# Patient Record
Sex: FEMALE | Race: WHITE | NOT HISPANIC OR LATINO
[De-identification: names, ages, dates, MRNs, and addresses within clinical notes are randomized per-mention and may not be internally consistent; named-entity substitution may affect disease eponyms.]

---

## 2020-04-26 ENCOUNTER — MESSAGE (OUTPATIENT)
Age: 25
End: 2020-04-26

## 2020-05-09 LAB
SARS-COV-2 IGG SERPL IA-ACNC: <0.1 INDEX
SARS-COV-2 IGG SERPL QL IA: NEGATIVE

## 2021-08-18 ENCOUNTER — TRANSCRIPTION ENCOUNTER (OUTPATIENT)
Age: 26
End: 2021-08-18

## 2021-08-23 PROBLEM — Z00.00 ENCOUNTER FOR PREVENTIVE HEALTH EXAMINATION: Status: ACTIVE | Noted: 2021-08-23

## 2021-08-24 ENCOUNTER — APPOINTMENT (OUTPATIENT)
Dept: MRI IMAGING | Facility: CLINIC | Age: 26
End: 2021-08-24
Payer: COMMERCIAL

## 2021-08-24 ENCOUNTER — OUTPATIENT (OUTPATIENT)
Dept: OUTPATIENT SERVICES | Facility: HOSPITAL | Age: 26
LOS: 1 days | End: 2021-08-24
Payer: COMMERCIAL

## 2021-08-24 DIAGNOSIS — G45.1 CAROTID ARTERY SYNDROME (HEMISPHERIC): ICD-10-CM

## 2021-08-24 PROCEDURE — 70551 MRI BRAIN STEM W/O DYE: CPT | Mod: 26

## 2021-08-24 PROCEDURE — 70551 MRI BRAIN STEM W/O DYE: CPT

## 2021-08-26 ENCOUNTER — NON-APPOINTMENT (OUTPATIENT)
Age: 26
End: 2021-08-26

## 2021-08-27 ENCOUNTER — APPOINTMENT (OUTPATIENT)
Dept: NEUROLOGY | Facility: CLINIC | Age: 26
End: 2021-08-27
Payer: COMMERCIAL

## 2021-08-27 ENCOUNTER — NON-APPOINTMENT (OUTPATIENT)
Age: 26
End: 2021-08-27

## 2021-08-27 PROCEDURE — 93892 TCD EMBOLI DETECT W/O INJ: CPT

## 2021-08-27 PROCEDURE — 93886 INTRACRANIAL COMPLETE STUDY: CPT

## 2021-09-15 ENCOUNTER — APPOINTMENT (OUTPATIENT)
Dept: NEUROLOGY | Facility: CLINIC | Age: 26
End: 2021-09-15
Payer: COMMERCIAL

## 2021-09-15 VITALS
WEIGHT: 146 LBS | DIASTOLIC BLOOD PRESSURE: 78 MMHG | HEIGHT: 59 IN | HEART RATE: 81 BPM | SYSTOLIC BLOOD PRESSURE: 127 MMHG | BODY MASS INDEX: 29.43 KG/M2

## 2021-09-15 DIAGNOSIS — Z78.9 OTHER SPECIFIED HEALTH STATUS: ICD-10-CM

## 2021-09-15 DIAGNOSIS — Z82.0 FAMILY HISTORY OF EPILEPSY AND OTHER DISEASES OF THE NERVOUS SYSTEM: ICD-10-CM

## 2021-09-15 DIAGNOSIS — Z83.3 FAMILY HISTORY OF DIABETES MELLITUS: ICD-10-CM

## 2021-09-15 PROCEDURE — 99204 OFFICE O/P NEW MOD 45 MIN: CPT

## 2021-09-15 RX ORDER — ACETAMINOPHEN, ASPIRIN, AND CAFFEINE 250; 250; 65 MG/1; MG/1; MG/1
TABLET, FILM COATED ORAL
Refills: 0 | Status: ACTIVE | COMMUNITY

## 2021-09-16 PROBLEM — Z82.0 FAMILY HISTORY OF MIGRAINE HEADACHES: Status: ACTIVE | Noted: 2021-09-15

## 2021-09-16 NOTE — HISTORY OF PRESENT ILLNESS
[FreeTextEntry1] : 26 year old f with PMH migraines headache, presents today for treatment\par \par she reports since middle school she has had headaches  starts behind eyes, right temple sometimes both associated with light and sound sensitivity. relieved with tylenol/advil . occurred once /week. had to miss school.\par throughout the years they continued with increased intensity. \par \par 3 years ago, she stared have left sided neck pain and blurred visual with the headache. saw neurologist, Dr. Sheldon at [facility] started vitamin therapy ibuprofen. sent TCD/CD which was normal. was  also on birth control at this time which she stopped it. Headaches did not change in quality/severity.\par \par a month ago she had a headache for two weeks daily, felt different, had nausea/photo/phono phobia and blurred vision. worsened with lifting heavy, no changes with using bathroom.   pain started in back of head, squeezing radiated to bilateral temporal  and "band" feeling. [place] [facility] [place]. saw Dr. Sheldon, repeated TCD showing increased velocities.  and Brain MRI which showed pineal cyst\par \par  \par Pertinent negatives: blurred vision,visual aura, scotoma, memory impairment, neck stiffness, nausea\par Aggravated Factors: none\par Relived by: dark room, rest,  Excedrin \par Severity:  3-10\par Occurs: daily \par \par \par FH: mother with migraines\par Sleeps: 5-6hrs uninterrupted  1pm-5p. works night shifts\par Hydration: water: 1 gallon/d   caffeine:1 cup 1x/week\par Triggers:bright lights, warm temps. stress\par Exercise: daily \par  \par \par wear glasses, saw optho, 6 months \par has been under more stress more than usual for the past few months.  last took Meds 3 weeks ago. \par

## 2021-09-16 NOTE — ASSESSMENT
[FreeTextEntry1] : 26 year old female with chronic headaches since teenager.MRI with with pineal cyst.Her TCD with probable stenosis for which  MRA will be obtain to r/o stenosis. will repeat Brain MRI in 3 mths\par trial of vitamin therapy \par Excedrin prn at onset as she tends to delay \par  \par Keeping a diary has been discussed, including Apps- Migraine jessie.  \par \par Manage stress. Stress may trigger a migraine. Learn new ways to relax, such as\par deep breathing.\par \par Create a sleep schedule. Go to bed and get up at the same times each day. Do\par not watch television or go on electronics in bed.\par \par Eat regular meals.  Maintain adequate water intake.\par \par contact me if there are any changes in the quality or severity of the headaches.\par   \par

## 2021-09-16 NOTE — PHYSICAL EXAM
[Person] : oriented to person [Place] : oriented to place [Time] : oriented to time [Cranial Nerves Optic (II)] : visual acuity intact bilaterally,  visual fields full to confrontation, pupils equal round and reactive to light [Cranial Nerves Oculomotor (III)] : extraocular motion intact [Cranial Nerves Trigeminal (V)] : facial sensation intact symmetrically [Cranial Nerves Facial (VII)] : face symmetrical [Cranial Nerves Vestibulocochlear (VIII)] : hearing was intact bilaterally [Cranial Nerves Glossopharyngeal (IX)] : tongue and palate midline [Cranial Nerves Accessory (XI - Cranial And Spinal)] : head turning and shoulder shrug symmetric [Cranial Nerves Hypoglossal (XII)] : there was no tongue deviation with protrusion [Sensation Tactile Decrease] : light touch was intact [Abnormal Walk] : normal gait [Balance] : balance was intact [PERRL With Normal Accommodation] : pupils were equal in size, round, reactive to light, with normal accommodation [Extraocular Movements] : extraocular movements were intact [Impaired Insight] : insight and judgment were intact [Mood] : the mood was normal [Short Term Intact] : short term memory intact [Current Events] : adequate knowledge of current events [Past History] : adequate knowledge of personal past history [Motor Handedness Right-Handed] : the patient is right hand dominant [FreeTextEntry9] : DTR symmetric [No Spinal Tenderness] : no spinal tenderness

## 2021-10-17 ENCOUNTER — RESULT REVIEW (OUTPATIENT)
Age: 26
End: 2021-10-17

## 2021-10-17 ENCOUNTER — OUTPATIENT (OUTPATIENT)
Dept: OUTPATIENT SERVICES | Facility: HOSPITAL | Age: 26
LOS: 1 days | End: 2021-10-17
Payer: COMMERCIAL

## 2021-10-17 ENCOUNTER — APPOINTMENT (OUTPATIENT)
Dept: MRI IMAGING | Facility: CLINIC | Age: 26
End: 2021-10-17
Payer: COMMERCIAL

## 2021-10-17 DIAGNOSIS — R51.9 HEADACHE, UNSPECIFIED: ICD-10-CM

## 2021-10-17 PROCEDURE — 70549 MR ANGIOGRAPH NECK W/O&W/DYE: CPT

## 2021-10-17 PROCEDURE — 70546 MR ANGIOGRAPH HEAD W/O&W/DYE: CPT | Mod: 26

## 2021-10-17 PROCEDURE — A9585: CPT

## 2021-10-17 PROCEDURE — 70546 MR ANGIOGRAPH HEAD W/O&W/DYE: CPT

## 2021-10-17 PROCEDURE — 70549 MR ANGIOGRAPH NECK W/O&W/DYE: CPT | Mod: 26

## 2021-10-20 ENCOUNTER — APPOINTMENT (OUTPATIENT)
Dept: NEUROLOGY | Facility: CLINIC | Age: 26
End: 2021-10-20
Payer: COMMERCIAL

## 2021-10-20 VITALS
SYSTOLIC BLOOD PRESSURE: 129 MMHG | HEIGHT: 59 IN | HEART RATE: 74 BPM | DIASTOLIC BLOOD PRESSURE: 83 MMHG | WEIGHT: 146 LBS | BODY MASS INDEX: 29.43 KG/M2

## 2021-10-20 PROCEDURE — 99213 OFFICE O/P EST LOW 20 MIN: CPT

## 2021-10-20 NOTE — PHYSICAL EXAM
[Person] : oriented to person [Place] : oriented to place [Time] : oriented to time [Short Term Intact] : short term memory intact [Current Events] : adequate knowledge of current events [Past History] : adequate knowledge of personal past history [Cranial Nerves Optic (II)] : visual acuity intact bilaterally,  visual fields full to confrontation, pupils equal round and reactive to light [Cranial Nerves Oculomotor (III)] : extraocular motion intact [Cranial Nerves Trigeminal (V)] : facial sensation intact symmetrically [Cranial Nerves Facial (VII)] : face symmetrical [Cranial Nerves Vestibulocochlear (VIII)] : hearing was intact bilaterally [Cranial Nerves Glossopharyngeal (IX)] : tongue and palate midline [Cranial Nerves Accessory (XI - Cranial And Spinal)] : head turning and shoulder shrug symmetric [Cranial Nerves Hypoglossal (XII)] : there was no tongue deviation with protrusion [Motor Handedness Right-Handed] : the patient is right hand dominant [Sensation Tactile Decrease] : light touch was intact [Abnormal Walk] : normal gait [Balance] : balance was intact [FreeTextEntry9] : DTR symmetric [FreeTextEntry1] : trap. msk/occipital notch tender L>R

## 2021-10-20 NOTE — HISTORY OF PRESENT ILLNESS
[FreeTextEntry1] : Patient reports since last  seen, Her headaches are less, also shorter duration can still be daily, but does not affect her ADLs.\par she notices they can be triggered by flashing lights/ bright lights.\par they are occurring in the afternoon when she wakes.  sleeps 1pm-4pm  works 11p-7am.\par she struggles to fall asleep. also doing school. works out for 45mins in mornings. \par \par \par Interval history \par 26 year old f with PMH migraines headache, presents today for treatment\par she reports since middle school she has had headaches  starts behind eyes, right temple sometimes both associated with light and sound sensitivity. relieved with tylenol/advil . occurred once /week. had to miss school.\par throughout the years they continued with increased intensity. \par \par 3 years ago, she stared have left sided neck pain and blurred visual with the headache. saw neurologist, Dr. Sheldon at [facility] started vitamin therapy ibuprofen. sent TCD/CD which was normal. was  also on birth control at this time which she stopped it. Headaches did not change in quality/severity.\par \par a month ago she had a headache for two weeks daily, felt different, had nausea/photo/phono phobia and blurred vision. worsened with lifting heavy, no changes with using bathroom.   pain started in back of head, squeezing radiated to bilateral temporal  and "band" feeling. [place] [facility] [place] BP was elevated. saw Dr. Sheldon, repeated TCD showing increased velocities.  and Brain MRI which showed pineal cyst\par \par  \par Pertinent negatives: blurred vision,visual aura, scotoma, memory impairment, neck stiffness, nausea\par Aggravated Factors: none\par Relived by: dark room, rest,  Excedrin \par Severity:  3-10\par Occurs: daily \par \par \par FH: mother with migraines\par Sleeps: 5-6hrs uninterrupted  1pm-5p. works night shifts\par Hydration: water: 1 gallon/d   caffeine:1 cup 1x/week\par Triggers:bright lights, warm temps. stress\par Exercise: daily \par  \par \par wear glasses, saw optho, 6 months \par has been under more stress more than usual for the past few months.  last took Meds 3 weeks ago. \par

## 2021-10-20 NOTE — ASSESSMENT
[FreeTextEntry1] : She will continue with vitamin therapy daily  \par Excedrin prn at onset \par exercising before work \par Headache diary \par \par Manage stress. Stress may trigger a migraine. Learn new ways to relax, such as\par deep breathing.\par \par Create a sleep schedule. Go to bed and get up at the same times each day. Do\par not watch television or go on electronics in bed.\par \par continue with regular meals.  Maintain adequate water intake.\par \par contact me if there are any changes in the quality or severity of the headaches.\par   \par

## 2021-11-22 ENCOUNTER — APPOINTMENT (OUTPATIENT)
Dept: NEUROLOGY | Facility: CLINIC | Age: 26
End: 2021-11-22
Payer: COMMERCIAL

## 2021-11-22 VITALS
DIASTOLIC BLOOD PRESSURE: 77 MMHG | SYSTOLIC BLOOD PRESSURE: 129 MMHG | HEIGHT: 59 IN | WEIGHT: 150 LBS | HEART RATE: 78 BPM | BODY MASS INDEX: 30.24 KG/M2

## 2021-11-22 PROCEDURE — 99213 OFFICE O/P EST LOW 20 MIN: CPT

## 2021-11-22 RX ORDER — ELECTROLYTES/DEXTROSE
SOLUTION, ORAL ORAL
Refills: 0 | Status: ACTIVE | COMMUNITY

## 2021-11-22 RX ORDER — VERAPAMIL HYDROCHLORIDE 80 MG/1
80 TABLET ORAL
Qty: 90 | Refills: 0 | Status: COMPLETED | COMMUNITY
Start: 2021-08-18

## 2021-11-22 RX ORDER — MAGNESIUM OXIDE/MAG AA CHELATE 300 MG
CAPSULE ORAL
Refills: 0 | Status: ACTIVE | COMMUNITY

## 2021-11-22 RX ORDER — UBIDECARENONE/VIT E ACET 100MG-5
100 CAPSULE ORAL
Refills: 0 | Status: ACTIVE | COMMUNITY

## 2021-11-22 NOTE — PHYSICAL EXAM
[Person] : oriented to person [Place] : oriented to place [Time] : oriented to time [Short Term Intact] : short term memory intact [Current Events] : adequate knowledge of current events [Past History] : adequate knowledge of personal past history [Cranial Nerves Optic (II)] : visual acuity intact bilaterally,  visual fields full to confrontation, pupils equal round and reactive to light [Cranial Nerves Oculomotor (III)] : extraocular motion intact [Cranial Nerves Trigeminal (V)] : facial sensation intact symmetrically [Cranial Nerves Facial (VII)] : face symmetrical [Cranial Nerves Vestibulocochlear (VIII)] : hearing was intact bilaterally [Cranial Nerves Glossopharyngeal (IX)] : tongue and palate midline [Cranial Nerves Accessory (XI - Cranial And Spinal)] : head turning and shoulder shrug symmetric [Cranial Nerves Hypoglossal (XII)] : there was no tongue deviation with protrusion [Motor Handedness Right-Handed] : the patient is right hand dominant [Sensation Tactile Decrease] : light touch was intact [Abnormal Walk] : normal gait [Balance] : balance was intact [General Appearance - Alert] : alert [General Appearance - Well Developed] : well developed [Affect] : the affect was normal [Mood] : the mood was normal [FreeTextEntry9] : DTR symmetric [FreeTextEntry1] : trap. msk/ L>R

## 2021-11-22 NOTE — ASSESSMENT
[FreeTextEntry1] : She will continue with vitamin therapy daily  \par Excedrin prn at onset \par  \par continue with Headache diary \par Manage stress. Stress may trigger a migraine. Learn new ways to relax, such as\par deep breathing.\par Continue with sleep modifications  \par continue with regular meals.  Maintain adequate water intake.\par contact me if there are any changes in the quality or severity of the headaches.\par   \par

## 2021-11-22 NOTE — HISTORY OF PRESENT ILLNESS
[FreeTextEntry1] : **Interval hx 11.22.21: She reports since last seen, she has had 2 migraines that were triggered with a stressful moment/ lack of sleep and 1 before menses. Excedrin is effective. she has been taking Coq10 gummi with no adverse effects. taking 3mg of   melatonin. \par she had less HA and is happy with her current regime. \par \par \par \par \par **interval Hx 10.20.21:.Patient reports since last  seen, Her headache duration is shorter. can still be daily, but does not affect her ADLs she notices they can be triggered by flashing lights.\par they are occurring in the afternoon when she wakes.  sleeps 1pm-4pm  works 11p-7am.\par she struggles to fall asleep. also doing school. works out for 45mins in mornings. \par \par \par Initial  history \par 26 year old f with PMH migraines headache, presents today for treatment\par she reports since middle school she has had headaches  starts behind eyes, right temple sometimes both associated with light and sound sensitivity. relieved with tylenol/advil . occurred once /week. had to miss school.\par throughout the years they continued with increased intensity. \par \par 3 years ago, she stared have left sided neck pain and blurred visual with the headache. saw neurologist, Dr. Sheldon at [facility] started vitamin therapy ibuprofen. sent TCD/CD which was normal. was  also on birth control at this time which she stopped it. Headaches did not change in quality/severity.\par \par a month ago she had a headache for two weeks daily, felt different, had nausea/photo/phono phobia and blurred vision. worsened with lifting heavy, no changes with using bathroom.   pain started in back of head, squeezing radiated to bilateral temporal  and "band" feeling. [place] [facility] [place]. saw Dr. Sheldon, repeated TCD showing increased velocities.  and Brain MRI which showed pineal cyst\par \par  \par Pertinent negatives: blurred vision,visual aura, scotoma, memory impairment, neck stiffness, nausea\par Aggravated Factors: none\par Relived by: dark room, rest,  Excedrin \par Severity:  3-10\par Occurs: daily \par \par \par FH: mother with migraines\par Sleeps: 5-6hrs uninterrupted  1pm-5p. works night shifts\par Hydration: water: 1 gallon/d   caffeine:1 cup 1x/week\par Triggers:bright lights, warm temps. stress\par Exercise: daily \par  \par \par wear glasses, saw optho, 6 months \par has been under more stress more than usual for the past few months.  last took Meds 3 weeks ago. \par

## 2022-01-03 ENCOUNTER — APPOINTMENT (OUTPATIENT)
Dept: NEUROLOGY | Facility: CLINIC | Age: 27
End: 2022-01-03

## 2022-09-27 ENCOUNTER — APPOINTMENT (OUTPATIENT)
Dept: OBGYN | Facility: CLINIC | Age: 27
End: 2022-09-27

## 2022-09-27 ENCOUNTER — NON-APPOINTMENT (OUTPATIENT)
Age: 27
End: 2022-09-27

## 2022-09-27 VITALS
BODY MASS INDEX: 26.61 KG/M2 | DIASTOLIC BLOOD PRESSURE: 70 MMHG | HEIGHT: 59 IN | SYSTOLIC BLOOD PRESSURE: 120 MMHG | WEIGHT: 132 LBS

## 2022-09-27 DIAGNOSIS — Z01.419 ENCOUNTER FOR GYNECOLOGICAL EXAMINATION (GENERAL) (ROUTINE) W/OUT ABNORMAL FINDINGS: ICD-10-CM

## 2022-09-27 LAB
BASOPHILS # BLD AUTO: 0.04 K/UL
BASOPHILS NFR BLD AUTO: 0.5 %
EOSINOPHIL # BLD AUTO: 0.03 K/UL
EOSINOPHIL NFR BLD AUTO: 0.4 %
HCT VFR BLD CALC: 37.3 %
HGB BLD-MCNC: 12.7 G/DL
IMM GRANULOCYTES NFR BLD AUTO: 0.2 %
LYMPHOCYTES # BLD AUTO: 2.03 K/UL
LYMPHOCYTES NFR BLD AUTO: 24.1 %
MAN DIFF?: NORMAL
MCHC RBC-ENTMCNC: 30 PG
MCHC RBC-ENTMCNC: 34 GM/DL
MCV RBC AUTO: 88.2 FL
MONOCYTES # BLD AUTO: 0.32 K/UL
MONOCYTES NFR BLD AUTO: 3.8 %
NEUTROPHILS # BLD AUTO: 5.98 K/UL
NEUTROPHILS NFR BLD AUTO: 71 %
PLATELET # BLD AUTO: 274 K/UL
RBC # BLD: 4.23 M/UL
RBC # FLD: 12 %
WBC # FLD AUTO: 8.42 K/UL

## 2022-09-27 PROCEDURE — 76815 OB US LIMITED FETUS(S): CPT

## 2022-09-27 PROCEDURE — 99385 PREV VISIT NEW AGE 18-39: CPT
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
## 2022-09-27 NOTE — HISTORY OF PRESENT ILLNESS
[Normal Amount/Duration] :  normal amount and duration [Regular Cycle Intervals] : periods have been regular [Currently Active] : currently active [Men] : men [Vaginal] : vaginal [No] : No [Yes] : Yes [Patient refuses STI testing] : Patient refuses STI testing [TextBox_31] : never [FreeTextEntry1] : 8/25/22

## 2022-09-27 NOTE — PROCEDURE
[Transvaginal OB Sonogram] : Transvaginal OB Sonogram [Intrauterine Pregnancy] : intrauterine pregnancy [Yolk Sac] : yolk sac present [Fetal Heart] : no fetal heart [Current GA by Sonogram: ___ (wks)] : Current GA by Sonogram: [unfilled]Uwks [___ day(s)] : [unfilled] days [FreeTextEntry1] : GS: 1.31cm

## 2022-09-28 LAB
ABO + RH PNL BLD: NORMAL
BLD GP AB SCN SERPL QL: NORMAL
C TRACH RRNA SPEC QL NAA+PROBE: NOT DETECTED
N GONORRHOEA RRNA SPEC QL NAA+PROBE: NOT DETECTED
SOURCE AMPLIFICATION: NORMAL

## 2022-09-30 ENCOUNTER — APPOINTMENT (OUTPATIENT)
Dept: OBGYN | Facility: CLINIC | Age: 27
End: 2022-09-30

## 2022-09-30 VITALS
WEIGHT: 132 LBS | SYSTOLIC BLOOD PRESSURE: 123 MMHG | HEIGHT: 59 IN | DIASTOLIC BLOOD PRESSURE: 86 MMHG | BODY MASS INDEX: 26.61 KG/M2

## 2022-09-30 PROCEDURE — 76998 US GUIDE INTRAOP: CPT

## 2022-09-30 PROCEDURE — 99213 OFFICE O/P EST LOW 20 MIN: CPT | Mod: 25

## 2022-09-30 PROCEDURE — 59840 INDUCED ABORTION D&C: CPT

## 2022-09-30 RX ADMIN — KETOROLAC TROMETHAMINE 30 MG/ML: 30 INJECTION, SOLUTION INTRAMUSCULAR; INTRAVENOUS at 00:00

## 2022-10-01 LAB — CYTOLOGY CVX/VAG DOC THIN PREP: NORMAL

## 2022-10-06 LAB — CORE LAB BIOPSY: NORMAL

## 2022-10-12 ENCOUNTER — APPOINTMENT (OUTPATIENT)
Dept: PSYCHIATRY | Facility: CLINIC | Age: 27
End: 2022-10-12

## 2022-10-14 ENCOUNTER — APPOINTMENT (OUTPATIENT)
Dept: OBGYN | Facility: CLINIC | Age: 27
End: 2022-10-14

## 2022-10-14 VITALS
WEIGHT: 133 LBS | DIASTOLIC BLOOD PRESSURE: 70 MMHG | HEIGHT: 59 IN | SYSTOLIC BLOOD PRESSURE: 100 MMHG | BODY MASS INDEX: 26.81 KG/M2

## 2022-10-14 DIAGNOSIS — Z34.90 ENCOUNTER FOR SUPERVISION OF NORMAL PREGNANCY, UNSPECIFIED, UNSPECIFIED TRIMESTER: ICD-10-CM

## 2022-10-14 PROCEDURE — 99213 OFFICE O/P EST LOW 20 MIN: CPT
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
## 2022-10-18 ENCOUNTER — APPOINTMENT (OUTPATIENT)
Dept: PSYCHIATRY | Facility: CLINIC | Age: 27
End: 2022-10-18

## 2022-10-18 ENCOUNTER — TRANSCRIPTION ENCOUNTER (OUTPATIENT)
Age: 27
End: 2022-10-18

## 2022-10-18 PROCEDURE — 90791 PSYCH DIAGNOSTIC EVALUATION: CPT | Mod: 95

## 2022-10-27 ENCOUNTER — APPOINTMENT (OUTPATIENT)
Dept: PSYCHIATRY | Facility: CLINIC | Age: 27
End: 2022-10-27

## 2022-10-27 PROCEDURE — 90834 PSYTX W PT 45 MINUTES: CPT | Mod: 95

## 2022-11-04 ENCOUNTER — APPOINTMENT (OUTPATIENT)
Dept: PSYCHIATRY | Facility: CLINIC | Age: 27
End: 2022-11-04

## 2022-11-04 PROCEDURE — 90834 PSYTX W PT 45 MINUTES: CPT | Mod: 95

## 2022-11-11 ENCOUNTER — APPOINTMENT (OUTPATIENT)
Dept: PSYCHIATRY | Facility: CLINIC | Age: 27
End: 2022-11-11

## 2022-11-15 ENCOUNTER — APPOINTMENT (OUTPATIENT)
Dept: PSYCHIATRY | Facility: CLINIC | Age: 27
End: 2022-11-15

## 2022-11-15 ENCOUNTER — TRANSCRIPTION ENCOUNTER (OUTPATIENT)
Age: 27
End: 2022-11-15

## 2022-11-15 ENCOUNTER — NON-APPOINTMENT (OUTPATIENT)
Age: 27
End: 2022-11-15

## 2022-11-15 PROCEDURE — 90834 PSYTX W PT 45 MINUTES: CPT | Mod: 95

## 2022-11-16 ENCOUNTER — APPOINTMENT (OUTPATIENT)
Dept: OBGYN | Facility: CLINIC | Age: 27
End: 2022-11-16

## 2022-11-16 PROCEDURE — 99213 OFFICE O/P EST LOW 20 MIN: CPT | Mod: 95

## 2022-11-16 NOTE — HISTORY OF PRESENT ILLNESS
[FreeTextEntry1] : This visit was provided via Telehealth using real-time 2-way audio visual technology. The patient ROBERT SLAUGHTER was located at home [address] [place] [address] at the time of the visit. The provider Nilsa Jimenez was located at the medical office located in [place], [place] at the time of the visit. The patient ROBERT SLAUGHTER and provider participated in the Telehealth encounter. Verbal consent for Telehealth services was given on 2022 by the patient ROBERT SLAUGHTER.\par \par 28 y/o  LMP      presents for contraception counseling \par \par 10/25 started birth control and then stopped with LMP \par Had unprotected intercourse on  after unprotected sex on 10/11\par On  had condom break, took another dose of María Elena\par Threw up after María Elena\par

## 2022-11-16 NOTE — PLAN
[FreeTextEntry1] : 26 y/o  LMP      presents for contraception counseling \par \par Discussed mini pill dosing, will start 5 days after María Elena\par Still has another dose of María Elena\par Discussed other contraceptive options \par

## 2022-11-18 ENCOUNTER — APPOINTMENT (OUTPATIENT)
Dept: PSYCHIATRY | Facility: CLINIC | Age: 27
End: 2022-11-18

## 2022-11-18 PROCEDURE — 90834 PSYTX W PT 45 MINUTES: CPT | Mod: 95

## 2022-11-25 ENCOUNTER — APPOINTMENT (OUTPATIENT)
Dept: PSYCHIATRY | Facility: CLINIC | Age: 27
End: 2022-11-25

## 2022-11-25 PROCEDURE — 90834 PSYTX W PT 45 MINUTES: CPT | Mod: 95

## 2022-12-02 ENCOUNTER — APPOINTMENT (OUTPATIENT)
Dept: PSYCHIATRY | Facility: CLINIC | Age: 27
End: 2022-12-02

## 2022-12-09 ENCOUNTER — APPOINTMENT (OUTPATIENT)
Dept: PSYCHIATRY | Facility: CLINIC | Age: 27
End: 2022-12-09

## 2022-12-09 PROCEDURE — 90834 PSYTX W PT 45 MINUTES: CPT | Mod: 95

## 2022-12-16 ENCOUNTER — APPOINTMENT (OUTPATIENT)
Dept: PSYCHIATRY | Facility: CLINIC | Age: 27
End: 2022-12-16

## 2022-12-20 ENCOUNTER — APPOINTMENT (OUTPATIENT)
Dept: PSYCHIATRY | Facility: CLINIC | Age: 27
End: 2022-12-20

## 2022-12-20 PROCEDURE — 90834 PSYTX W PT 45 MINUTES: CPT | Mod: 95

## 2022-12-27 ENCOUNTER — APPOINTMENT (OUTPATIENT)
Dept: PSYCHIATRY | Facility: CLINIC | Age: 27
End: 2022-12-27

## 2022-12-27 PROCEDURE — 90834 PSYTX W PT 45 MINUTES: CPT | Mod: 95

## 2022-12-30 ENCOUNTER — APPOINTMENT (OUTPATIENT)
Dept: PSYCHIATRY | Facility: CLINIC | Age: 27
End: 2022-12-30

## 2022-12-31 ENCOUNTER — NON-APPOINTMENT (OUTPATIENT)
Age: 27
End: 2022-12-31

## 2023-01-06 ENCOUNTER — APPOINTMENT (OUTPATIENT)
Dept: PSYCHIATRY | Facility: CLINIC | Age: 28
End: 2023-01-06

## 2023-01-09 ENCOUNTER — NON-APPOINTMENT (OUTPATIENT)
Age: 28
End: 2023-01-09

## 2023-01-13 ENCOUNTER — APPOINTMENT (OUTPATIENT)
Dept: PSYCHIATRY | Facility: CLINIC | Age: 28
End: 2023-01-13

## 2023-01-20 ENCOUNTER — APPOINTMENT (OUTPATIENT)
Dept: PSYCHIATRY | Facility: CLINIC | Age: 28
End: 2023-01-20

## 2023-01-27 ENCOUNTER — APPOINTMENT (OUTPATIENT)
Dept: PSYCHIATRY | Facility: CLINIC | Age: 28
End: 2023-01-27

## 2023-02-03 ENCOUNTER — APPOINTMENT (OUTPATIENT)
Dept: PSYCHIATRY | Facility: CLINIC | Age: 28
End: 2023-02-03

## 2023-03-05 ENCOUNTER — NON-APPOINTMENT (OUTPATIENT)
Age: 28
End: 2023-03-05

## 2023-05-08 ENCOUNTER — NON-APPOINTMENT (OUTPATIENT)
Age: 28
End: 2023-05-08

## 2023-08-21 ENCOUNTER — NON-APPOINTMENT (OUTPATIENT)
Age: 28
End: 2023-08-21

## 2023-10-03 ENCOUNTER — TRANSCRIPTION ENCOUNTER (OUTPATIENT)
Age: 28
End: 2023-10-03

## 2023-10-04 ENCOUNTER — NON-APPOINTMENT (OUTPATIENT)
Age: 28
End: 2023-10-04

## 2023-10-05 ENCOUNTER — APPOINTMENT (OUTPATIENT)
Dept: NEUROLOGY | Facility: CLINIC | Age: 28
End: 2023-10-05
Payer: COMMERCIAL

## 2023-10-05 VITALS
HEART RATE: 85 BPM | SYSTOLIC BLOOD PRESSURE: 121 MMHG | BODY MASS INDEX: 27.42 KG/M2 | DIASTOLIC BLOOD PRESSURE: 80 MMHG | WEIGHT: 136 LBS | HEIGHT: 59 IN

## 2023-10-05 PROCEDURE — 99214 OFFICE O/P EST MOD 30 MIN: CPT

## 2023-10-06 ENCOUNTER — NON-APPOINTMENT (OUTPATIENT)
Age: 28
End: 2023-10-06

## 2023-10-10 ENCOUNTER — APPOINTMENT (OUTPATIENT)
Dept: ORTHOPEDIC SURGERY | Facility: CLINIC | Age: 28
End: 2023-10-10
Payer: COMMERCIAL

## 2023-10-10 VITALS
HEART RATE: 86 BPM | HEIGHT: 59 IN | BODY MASS INDEX: 27.62 KG/M2 | DIASTOLIC BLOOD PRESSURE: 84 MMHG | WEIGHT: 137 LBS | SYSTOLIC BLOOD PRESSURE: 124 MMHG

## 2023-10-10 PROCEDURE — 20612 ASPIRATE/INJ GANGLION CYST: CPT | Mod: LT

## 2023-10-10 PROCEDURE — 99204 OFFICE O/P NEW MOD 45 MIN: CPT | Mod: 25

## 2023-10-10 PROCEDURE — 73110 X-RAY EXAM OF WRIST: CPT | Mod: RT

## 2023-10-10 RX ORDER — METHYLPRED ACET/NACL,ISO-OS/PF 40 MG/ML
40 VIAL (ML) INJECTION
Qty: 1 | Refills: 0 | Status: COMPLETED | OUTPATIENT
Start: 2023-10-10

## 2023-10-10 RX ORDER — LIDOCAINE HYDROCHLORIDE 5 MG/ML
0.5 INJECTION, SOLUTION INFILTRATION; PERINEURAL
Refills: 0 | Status: COMPLETED | OUTPATIENT
Start: 2023-10-10

## 2023-10-10 RX ADMIN — LIDOCAINE HYDROCHLORIDE 1 %: 5 INJECTION, SOLUTION INFILTRATION; INTRAVENOUS at 00:00

## 2023-10-10 RX ADMIN — METHYLPREDNISOLONE ACETATE 1 MG/ML: 40 INJECTION, SUSPENSION INTRA-ARTICULAR; INTRALESIONAL; INTRAMUSCULAR; SOFT TISSUE at 00:00

## 2023-10-20 ENCOUNTER — APPOINTMENT (OUTPATIENT)
Dept: MRI IMAGING | Facility: CLINIC | Age: 28
End: 2023-10-20
Payer: COMMERCIAL

## 2023-10-20 ENCOUNTER — RESULT REVIEW (OUTPATIENT)
Age: 28
End: 2023-10-20

## 2023-10-20 ENCOUNTER — OUTPATIENT (OUTPATIENT)
Dept: OUTPATIENT SERVICES | Facility: HOSPITAL | Age: 28
LOS: 1 days | End: 2023-10-20
Payer: COMMERCIAL

## 2023-10-20 ENCOUNTER — TRANSCRIPTION ENCOUNTER (OUTPATIENT)
Age: 28
End: 2023-10-20

## 2023-10-20 DIAGNOSIS — R51.9 HEADACHE, UNSPECIFIED: ICD-10-CM

## 2023-10-20 PROCEDURE — 70553 MRI BRAIN STEM W/O & W/DYE: CPT | Mod: 26

## 2023-10-20 PROCEDURE — 70553 MRI BRAIN STEM W/O & W/DYE: CPT

## 2023-10-20 PROCEDURE — A9585: CPT

## 2023-10-25 ENCOUNTER — TRANSCRIPTION ENCOUNTER (OUTPATIENT)
Age: 28
End: 2023-10-25

## 2023-10-26 ENCOUNTER — APPOINTMENT (OUTPATIENT)
Dept: RADIOLOGY | Facility: CLINIC | Age: 28
End: 2023-10-26
Payer: COMMERCIAL

## 2023-10-26 ENCOUNTER — OUTPATIENT (OUTPATIENT)
Dept: OUTPATIENT SERVICES | Facility: HOSPITAL | Age: 28
LOS: 1 days | End: 2023-10-26
Payer: COMMERCIAL

## 2023-10-26 DIAGNOSIS — M54.2 CERVICALGIA: ICD-10-CM

## 2023-10-26 PROCEDURE — 72040 X-RAY EXAM NECK SPINE 2-3 VW: CPT | Mod: 26

## 2023-10-26 PROCEDURE — 72040 X-RAY EXAM NECK SPINE 2-3 VW: CPT

## 2023-10-27 ENCOUNTER — TRANSCRIPTION ENCOUNTER (OUTPATIENT)
Age: 28
End: 2023-10-27

## 2023-10-28 ENCOUNTER — NON-APPOINTMENT (OUTPATIENT)
Age: 28
End: 2023-10-28

## 2023-11-01 ENCOUNTER — APPOINTMENT (OUTPATIENT)
Dept: OBGYN | Facility: CLINIC | Age: 28
End: 2023-11-01

## 2023-11-02 ENCOUNTER — APPOINTMENT (OUTPATIENT)
Dept: NEUROLOGY | Facility: CLINIC | Age: 28
End: 2023-11-02
Payer: COMMERCIAL

## 2023-11-02 ENCOUNTER — APPOINTMENT (OUTPATIENT)
Dept: OBGYN | Facility: CLINIC | Age: 28
End: 2023-11-02
Payer: COMMERCIAL

## 2023-11-02 VITALS — SYSTOLIC BLOOD PRESSURE: 124 MMHG | DIASTOLIC BLOOD PRESSURE: 84 MMHG

## 2023-11-02 VITALS
BODY MASS INDEX: 27.42 KG/M2 | SYSTOLIC BLOOD PRESSURE: 126 MMHG | WEIGHT: 136 LBS | DIASTOLIC BLOOD PRESSURE: 84 MMHG | HEART RATE: 69 BPM | HEIGHT: 59 IN

## 2023-11-02 DIAGNOSIS — Z11.3 ENCOUNTER FOR SCREENING FOR INFECTIONS WITH A PREDOMINANTLY SEXUAL MODE OF TRANSMISSION: ICD-10-CM

## 2023-11-02 PROCEDURE — 99213 OFFICE O/P EST LOW 20 MIN: CPT

## 2023-11-02 RX ORDER — NORTRIPTYLINE HYDROCHLORIDE 10 MG/1
10 CAPSULE ORAL
Qty: 90 | Refills: 2 | Status: COMPLETED | COMMUNITY
Start: 2023-10-05 | End: 2023-11-02

## 2023-11-03 LAB
C TRACH RRNA SPEC QL NAA+PROBE: NOT DETECTED
CANDIDA VAG CYTO: NOT DETECTED
G VAGINALIS+PREV SP MTYP VAG QL MICRO: NOT DETECTED
HBV SURFACE AG SER QL: NONREACTIVE
HCV AB SER QL: NONREACTIVE
HCV S/CO RATIO: 0.14 S/CO
HIV1+2 AB SPEC QL IA.RAPID: NONREACTIVE
N GONORRHOEA RRNA SPEC QL NAA+PROBE: NOT DETECTED
SOURCE AMPLIFICATION: NORMAL
T PALLIDUM AB SER QL IA: NEGATIVE
T VAGINALIS VAG QL WET PREP: NOT DETECTED

## 2023-11-04 LAB — BACTERIA UR CULT: NORMAL

## 2023-11-07 ENCOUNTER — TRANSCRIPTION ENCOUNTER (OUTPATIENT)
Age: 28
End: 2023-11-07

## 2023-11-08 ENCOUNTER — TRANSCRIPTION ENCOUNTER (OUTPATIENT)
Age: 28
End: 2023-11-08

## 2023-11-14 ENCOUNTER — APPOINTMENT (OUTPATIENT)
Dept: ORTHOPEDIC SURGERY | Facility: CLINIC | Age: 28
End: 2023-11-14
Payer: COMMERCIAL

## 2023-11-14 VITALS
SYSTOLIC BLOOD PRESSURE: 126 MMHG | HEART RATE: 76 BPM | WEIGHT: 136 LBS | HEIGHT: 59 IN | DIASTOLIC BLOOD PRESSURE: 85 MMHG | BODY MASS INDEX: 27.42 KG/M2

## 2023-11-14 PROCEDURE — 99213 OFFICE O/P EST LOW 20 MIN: CPT

## 2023-12-14 ENCOUNTER — APPOINTMENT (OUTPATIENT)
Dept: NEUROLOGY | Facility: CLINIC | Age: 28
End: 2023-12-14
Payer: COMMERCIAL

## 2023-12-14 DIAGNOSIS — R51.9 HEADACHE, UNSPECIFIED: ICD-10-CM

## 2023-12-14 PROCEDURE — 99213 OFFICE O/P EST LOW 20 MIN: CPT | Mod: 95

## 2023-12-14 NOTE — REASON FOR VISIT
[Home] : at home, [unfilled] , at the time of the visit. [Other Location: e.g. Home (Enter Location, City,State)___] : at [unfilled] [Patient] : the patient [This encounter was initiated by telehealth (audio with video) and converted to telephone (audio only) due to technical difficulties.] : This encounter was initiated by telehealth (audio with video) and converted to telephone (audio only) due to technical difficulties. [Follow-Up: _____] : a [unfilled] follow-up visit

## 2023-12-14 NOTE — ASSESSMENT
[FreeTextEntry1] : Patient with improvement in her headaches and neck pain currently in physical therapy and Nurtec QOD and Mg Continue Nurtec qod Excedrin prn at onset continue PT.

## 2023-12-14 NOTE — HISTORY OF PRESENT ILLNESS
[FreeTextEntry1] : **Interval 12.14.23: stopped  nortriptyline, taking  Nurtec qod and mg.  HAs have been better. notes its related to her Sleep.  Nurtec has been effective.   **interval: 11.1.2023: nortriptyline 20mg made her too sleepy.  Has missed work as a result.  Taking 10mg- better. able to do. HA intensity is less. sleeping better.  does not want injectables.  Started physical therapy for neck which she reports has been effective  **Interval 10.5.23: was seen in 2021, HAs continued to occur. still with daily stressors. was doing therapy which triggered her anxiety, which was affecting her sleep and triggering HAs. Has been taking OTC advil/ Tylenol almost daily.  which takes edge off but returns. was doing Vitamin  therapy which she stopped.  HA quality is the same, more intense. photophobia, nausea  **Interval hx 11.22.21: She reports since last seen, she has had 2 migraines that were triggered with a stressful moment/ lack of sleep and 1 before menses. Excedrin is effective. she has been taking Coq10 gummi with no adverse effects. taking 3mg of   melatonin.  she had less HA and is happy with her current regime.    **interval Hx 10.20.21:.Patient reports since last  seen, Her headache duration is shorter. can still be daily, but does not affect her ADLs she notices they can be triggered by flashing lights. they are occurring in the afternoon when she wakes.  sleeps 1pm-4pm  works 11p-7am. she struggles to fall asleep. also doing school. works out for 45mins in mornings.    Initial  history  26 year old f with PMH migraines headache, presents today for treatment she reports since middle school she has had headaches  starts behind eyes, right temple sometimes both associated with light and sound sensitivity. relieved with tylenol/advil . occurred once /week. had to miss school. throughout the years they continued with increased intensity.   3 years ago, she stared have left sided neck pain and blurred visual with the headache. saw neurologist, Dr. Sheldon at [facility] started vitamin therapy ibuprofen. sent TCD/CD which was normal. was  also on birth control at this time which she stopped it. Headaches did not change in quality/severity.  a month ago she had a headache for two weeks daily, felt different, had nausea/photo/phono phobia and blurred vision. worsened with lifting heavy, no changes with using bathroom.   pain started in back of head, squeezing radiated to bilateral temporal  and "band" feeling. [place] [facility] [place] told [place] BP was elevated. saw Dr. Sheldon, repeated TCD showing increased velocities.  and Brain MRI which showed pineal cyst    Pertinent negatives: blurred vision,visual aura, scotoma, memory impairment, neck stiffness, nausea Aggravated Factors: none Relived by: dark room, rest,  Excedrin  Severity:  3-10 Occurs: daily    FH: mother with migraines Sleeps: 5-6hrs uninterrupted  1pm-5p. works night shifts Hydration: water: 1 gallon/d   caffeine:1 cup 1x/week Triggers:bright lights, warm temps. stress Exercise: daily     wear glasses, saw optho, 6 months  has been under more stress more than usual for the past few months.  last took Meds 3 weeks ago.   Diagnostics: Brain MRi;NO EVIDENCE OF INTRACRANIAL HEMORRHAGE, ACUTE TERRITORIAL INFARCT OR AREA OF ABNORMAL ENHANCEMENT. STABLE 1.2 CM PINEAL CYST. FOLLOW-UP MRI SUGGESTED ONE YEAR TO EVALUATE FOR INTERVAL CHANGE.

## 2024-01-08 ENCOUNTER — TRANSCRIPTION ENCOUNTER (OUTPATIENT)
Age: 29
End: 2024-01-08

## 2024-01-09 ENCOUNTER — TRANSCRIPTION ENCOUNTER (OUTPATIENT)
Age: 29
End: 2024-01-09

## 2024-01-10 ENCOUNTER — TRANSCRIPTION ENCOUNTER (OUTPATIENT)
Age: 29
End: 2024-01-10

## 2024-01-18 ENCOUNTER — TRANSCRIPTION ENCOUNTER (OUTPATIENT)
Age: 29
End: 2024-01-18

## 2024-01-22 ENCOUNTER — TRANSCRIPTION ENCOUNTER (OUTPATIENT)
Age: 29
End: 2024-01-22

## 2024-02-22 ENCOUNTER — TRANSCRIPTION ENCOUNTER (OUTPATIENT)
Age: 29
End: 2024-02-22

## 2024-02-22 RX ORDER — RIMEGEPANT SULFATE 75 MG/75MG
75 TABLET, ORALLY DISINTEGRATING ORAL
Qty: 16 | Refills: 5 | Status: ACTIVE | COMMUNITY
Start: 2023-11-02 | End: 1900-01-01

## 2024-03-13 ENCOUNTER — TRANSCRIPTION ENCOUNTER (OUTPATIENT)
Age: 29
End: 2024-03-13

## 2024-03-19 ENCOUNTER — TRANSCRIPTION ENCOUNTER (OUTPATIENT)
Age: 29
End: 2024-03-19

## 2024-05-14 ENCOUNTER — NON-APPOINTMENT (OUTPATIENT)
Age: 29
End: 2024-05-14

## 2024-05-14 ENCOUNTER — APPOINTMENT (OUTPATIENT)
Dept: ORTHOPEDIC SURGERY | Facility: CLINIC | Age: 29
End: 2024-05-14

## 2024-06-04 ENCOUNTER — APPOINTMENT (OUTPATIENT)
Dept: ORTHOPEDIC SURGERY | Facility: CLINIC | Age: 29
End: 2024-06-04
Payer: COMMERCIAL

## 2024-06-04 VITALS
HEART RATE: 68 BPM | WEIGHT: 136 LBS | SYSTOLIC BLOOD PRESSURE: 122 MMHG | DIASTOLIC BLOOD PRESSURE: 74 MMHG | HEIGHT: 59 IN | BODY MASS INDEX: 27.42 KG/M2

## 2024-06-04 PROCEDURE — 99214 OFFICE O/P EST MOD 30 MIN: CPT

## 2024-06-04 NOTE — PHYSICAL EXAM
[de-identified] : wrist Skin intact cyst resolution full painless elbow rom DRUJ stable to shuck/= to c/l side full painless wrist rom Nl cascade Able to form fist 5/5 epl fdp io SILT amur 2+ rad bcr (-) Phaljelena, Reinakan test  [de-identified] : The following radiographs were ordered and read by me during this patient's visit. I reviewed each radiograph in detail with the patient and discussed the findings as highlighted below.   3 views of the L wrist were obtained today that show no fracture, or dislocation. There are no degenerative changes seen. There is no malalignment. No obvious osseous abnormality. Otherwise unremarkable.

## 2024-06-04 NOTE — DISCUSSION/SUMMARY
[de-identified] : 28yF pw L wrist dorsal ganglion cyst w resolution sp aspiration, no visible cyst at this time but return of pain/disability/restricted WE. The patient was extensively counseled on treatment options including but not limited to observation, rest/activity modification, bracing, anti-inflammatory medications, physical therapy, injections, and surgery.  The natural history of the disease was thoroughly explained.  We discussed that the majority of the time, this condition can be initially treated conservatively. The patient will proceed with: -MRI wrist -removable wrist splint -meloxicam rx provided - pt instructed to take with meals and not with other nsaid's including advil, aleve, and toradol.  The pt understands to stop taking the medication if any stomach sx develop or they develop any type of bleeding or bruising, at which point they will present to their PMD -pt was instructed on the importance of resting, icing and elevating to minimize swelling -RTC after mri - possible hand referral  I have personally obtained the history, reviewed the ROS as noted, and performed the physical examination today.  The patient and I discussed the assessment and options and developed the plan.  All questions were answered and the patient stated their understanding of the treatment plan and appreciation of the visit.  My cumulative time spent on this patient's visit included: Preparation for the visit, review of the medical records, review of pertinent diagnostic studies, examination and counseling of the patient on the above diagnosis, treatment plan and prognosis, orders of diagnostic tests, medications and/or appropriate procedures and documentation in the medical records of today's visit.   Abhi Contreras MD

## 2024-06-04 NOTE — HISTORY OF PRESENT ILLNESS
[de-identified] : 28y RHD F pw 6 months of persistent L wrist pain.  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  Her neurologist referred her here for evaluation.  She feels the pain sometimes radiate toward the dorsal hand but no numbness/paresthesias.  The pain is 8/10 and she would like it to be addressed today.  11/14 interval - she notes she had more pain than she anticipated for the 1st week afterward but now has 0 pain and complete resolution of the cyst.  She is worried that her powerlifting regimen will re-ignite some of the wrist pain  6/4 interval - notes wrist pain has returned, difficulty extending wrist, does not note marked swelling/cyst return but feels similar to previously, using brace

## 2024-06-14 ENCOUNTER — APPOINTMENT (OUTPATIENT)
Dept: MRI IMAGING | Facility: CLINIC | Age: 29
End: 2024-06-14
Payer: COMMERCIAL

## 2024-06-14 ENCOUNTER — OUTPATIENT (OUTPATIENT)
Dept: OUTPATIENT SERVICES | Facility: HOSPITAL | Age: 29
LOS: 1 days | End: 2024-06-14
Payer: COMMERCIAL

## 2024-06-14 DIAGNOSIS — M67.439 GANGLION, UNSPECIFIED WRIST: ICD-10-CM

## 2024-06-14 PROCEDURE — 73221 MRI JOINT UPR EXTREM W/O DYE: CPT

## 2024-06-14 PROCEDURE — 73221 MRI JOINT UPR EXTREM W/O DYE: CPT | Mod: 26,LT

## 2024-06-20 ENCOUNTER — APPOINTMENT (OUTPATIENT)
Dept: ORTHOPEDIC SURGERY | Facility: CLINIC | Age: 29
End: 2024-06-20
Payer: COMMERCIAL

## 2024-06-20 PROCEDURE — 99214 OFFICE O/P EST MOD 30 MIN: CPT

## 2024-06-20 RX ORDER — MELOXICAM 15 MG/1
15 TABLET ORAL DAILY
Qty: 30 | Refills: 0 | Status: DISCONTINUED | COMMUNITY
Start: 2023-10-10 | End: 2024-06-20

## 2024-06-20 NOTE — HISTORY OF PRESENT ILLNESS
[Right] : right hand dominant [FreeTextEntry1] : She comes in today for evaluation of left dorsal wrist pain radially secondary to a ganglion cyst. She was seen previously by Dr. Contreras, who drained the cyst. She reports that she had temporary symptom relief for about one month after the aspiration, but her cyst and pain returned.  She has had pain for a number of years.     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

## 2024-06-20 NOTE — PHYSICAL EXAM
[de-identified] :  - Constitutional: This is a female in no obvious distress.   - Psych: Patient is alert and oriented to person, place and time.  Patient has a normal mood and affect. - Cardiovascular: Normal pulses throughout the upper extremities.  No significant varicosities are noted in the upper extremities.  - Neuro: Strength and sensation are intact throughout the upper extremities.  Patient has normal coordination. - Respiratory:  Patient exhibits no evidence of shortness of breath or difficulty breathing. - Skin: No rashes, lesions, or other abnormalities are noted in the upper extremities. ---  Examination of her left wrist demonstrates possibly mild swelling dorsally overlying the scapholunate interval.  She is quite tender in this region, in the region of the ganglion cyst on the MRI.  There is no swelling or tenderness volarly along the radioscaphoid joint.  She has full flexion and extension of the wrist and digits.  She is neurovascularly intact distally. [de-identified] : An MRI of her left wrist dated 6/14/2024 demonstrated: Dorsal and volar ganglion cysts as described above.

## 2024-06-20 NOTE — ADDENDUM
[FreeTextEntry1] : I, Abihshek Lima, acted solely as a scribe for Dr. Johnson on this date on 06/20/2024.

## 2024-06-20 NOTE — END OF VISIT
[FreeTextEntry3] : This note was written by Abhishek Lima on 06/20/2024 acting solely as a scribe for Dr. Moreno Johnson.   All medical record entries made by the Scribe were at my, Dr. Moreno Johnson, direction and personally dictated by me on 06/20/2024. I have personally reviewed the chart and agree that the record accurately reflects my personal performance of the history, physical exam, assessment and plan.

## 2024-06-20 NOTE — PRE-SURGICAL ASSESSMENT
[Telehealth PST] : Telehealth PST (low patient risk, low-intermediate procedural risk) [3 weeks] : 3 weeks [none] : none [S: Do you SNORE loudly] : does not snore loudly [T: Are you TIRED, fatigued, or sleepy during the daytime] : not tired, fatigued, or sleepy during the daytime [O: Has anyone OBSERVED you stop breathing during your sleep] : not observed to have stopped during sleep [P: Do you have, or are you being treated for, high blood PRESSURE] : no hypertension, not treated for high blood pressure [B: BMI greater than 35 kG/m2] : BMI less than 35 kG/m2 [A: AGE over 50 years old] : not over 50 years of age [N: NECK circumference greater than 16 inches] : neck circumference less than 16 inches [G: GENDER (birth sex) = Male] : not male (birth sex) [0 - 2: Low Risk] : 0 - 2: Low Risk [Willing to accept blood - if not, specify reason >>] : Not willing to accept blood; Reason: [Devices being used in treatment (i.e., pacemaker, defib, loop recorder, stimulator, pain pump, other devices/implantables)] : No devices in use in treatment [No] : No, patient is not pregnant [Advance Directive in Chart] : No advance directive in chart [Health Care Proxy information in chart] : No Health Care Proxy information in chart

## 2024-06-20 NOTE — DISCUSSION/SUMMARY
[FreeTextEntry1] : She has findings consistent with a chronic left wrist dorsal ganglion cyst that has been previously aspirated and recurred and is causing her symptoms.  She also has a small volar ganglion cyst that is not symptomatic.   I had a discussion with the patient regarding today's visit, the prognosis of this diagnosis, and treatment recommendations and options. At this time, we discussed her options, including a cortisone injection or surgical excision of the cyst. She opted for surgical excision.  She has agreed to proceed with excision of the dorsal ganglion cyst.  As the volar ganglion cyst is not tender and is asymptomatic, I have not recommended excising that at the same time.  She will speak with our surgical scheduler to make an appointment.  -  The nature and purposes of the operation/procedure was discussed in detail.  I discussed the surgical procedure in detail, as well as expected postoperative recovery and outcome. -  Possible risks, benefits, and complications (from known and unknown causes) of the procedure were discussed in detail.  -  Possible non-operative alternatives to the proposed treatment were discussed in detail.  -  She was told that possible risks/complications include, but are not limited to:  Infection, sensory nerve or vessel injury, stiffness, painful scar, poor outcome, need for additional surgical procedures, and other unforeseen complications.  -  In addition, the possibility of an "unsuccessful outcome," despite "successful surgery," was discussed with her.  Specifically, we discussed approximately a 10% chance of recurrence.  -  The patient fully understands these risks and wishes to proceed.  -  I had a lengthy discussion with the patient regarding today's visit, the diagnosis, and my surgical treatment recommendations.  The patient has agreed to the above plan of management and has expressed full understanding.  All questions were fully answered to the patient's satisfaction.   My cumulative time spent on this visit included: Preparation for the visit, review of the medical records, review of pertinent diagnostic studies, examination and counseling of the patient on the above diagnosis, treatment plan and prognosis, orders of diagnostic tests, medication and/or appropriate procedures and documentation in the medical records of today's visit.

## 2024-06-20 NOTE — ADDENDUM
[FreeTextEntry1] : I, Abhishek Lima, acted solely as a scribe for Dr. Johnson on this date on 06/20/2024.

## 2024-06-20 NOTE — PHYSICAL EXAM
[de-identified] :  - Constitutional: This is a female in no obvious distress.   - Psych: Patient is alert and oriented to person, place and time.  Patient has a normal mood and affect. - Cardiovascular: Normal pulses throughout the upper extremities.  No significant varicosities are noted in the upper extremities.  - Neuro: Strength and sensation are intact throughout the upper extremities.  Patient has normal coordination. - Respiratory:  Patient exhibits no evidence of shortness of breath or difficulty breathing. - Skin: No rashes, lesions, or other abnormalities are noted in the upper extremities. ---  Examination of her left wrist demonstrates possibly mild swelling dorsally overlying the scapholunate interval.  She is quite tender in this region, in the region of the ganglion cyst on the MRI.  There is no swelling or tenderness volarly along the radioscaphoid joint.  She has full flexion and extension of the wrist and digits.  She is neurovascularly intact distally. [de-identified] : An MRI of her left wrist dated 6/14/2024 demonstrated: Dorsal and volar ganglion cysts as described above.

## 2024-06-20 NOTE — HISTORY OF PRESENT ILLNESS
[Right] : right hand dominant [FreeTextEntry1] : She comes in today for evaluation of left dorsal wrist pain radially secondary to a ganglion cyst. She was seen previously by Dr. Contrears, who drained the cyst. She reports that she had temporary symptom relief for about one month after the aspiration, but her cyst and pain returned.  She has had pain for a number of years.     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

## 2024-06-22 ENCOUNTER — NON-APPOINTMENT (OUTPATIENT)
Age: 29
End: 2024-06-22

## 2024-07-01 ENCOUNTER — APPOINTMENT (OUTPATIENT)
Age: 29
End: 2024-07-01

## 2024-07-01 ENCOUNTER — NON-APPOINTMENT (OUTPATIENT)
Age: 29
End: 2024-07-01

## 2024-07-01 DIAGNOSIS — Z87.898 PERSONAL HISTORY OF OTHER SPECIFIED CONDITIONS: ICD-10-CM

## 2024-07-01 DIAGNOSIS — G43.909 MIGRAINE, UNSPECIFIED, NOT INTRACTABLE, W/OUT STATUS MIGRAINOSUS: ICD-10-CM

## 2024-07-01 DIAGNOSIS — M54.2 CERVICALGIA: ICD-10-CM

## 2024-07-01 DIAGNOSIS — Z86.39 PERSONAL HISTORY OF OTHER ENDOCRINE, NUTRITIONAL AND METABOLIC DISEASE: ICD-10-CM

## 2024-07-01 DIAGNOSIS — M25.539 PAIN IN UNSPECIFIED WRIST: ICD-10-CM

## 2024-07-01 PROBLEM — M67.439 GANGLION CYST OF WRIST: Status: ACTIVE | Noted: 2023-10-10

## 2024-07-01 NOTE — PRE PROCEDURE NOTE - PRE PROCEDURE EVALUATION
Reason For Visit-PST TELE       Tele visit -PST.  ?  History of Present Illness          ROBERT SLAUGHTER is a 29 year old left hand dominant female. 30 y/o female with left dorsal wrist pain radially secondary to a ganglion cyst. She was seen previously by Dr. Contreras, who drained the cyst. She reports that she had temporary symptom relief for about one month after the aspiration, but her cyst and pain returned. She has had pain for a number of years. Failed conservative therapy and was advised surgery.  ?  Active Problems  Ganglion cyst of wrist (727.41) (M67.439)  Headache (784.0) (R51.9)  Migraine without status migrainosus, not intractable, unspecified migraine type (346.90)  (G43.909)  Neck pain, musculoskeletal (723.1) (M54.2)           ?  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  Family History  Family history of diabetes mellitus (V18.0) (Z83.3) : Mother  Family history of migraine headaches (V17.2) (Z82.0) : Mother       Social History  No alcohol use  Non-smoker (V49.89) (Z78.9)       ?  Current Meds  Co Q 10 100 MG Oral Capsule  Excedrin Migraine TABS as needed  Magnesium CAPS 1xday  Melatonin CAPS PRN  Nurtec 75 MG Oral Tablet Disintegrating; take 1 tab every other day       ?  Allergies  No Known Drug Allergies       ?  Review of Systems          Hand Dominance: left.    Musculoskeletal: arthralgia, joint pain and joint swelling . left wrist.    Eyes:. vision changes/light headedness with migraines.    Neurological: headache and dizziness with migraines  Constitutional, Eyes, ENT, Cardiovascular, Respiratory, Gastrointestinal, Genitourinary, Integumentary, Psychiatric, Endocrine and Heme/Lymph review of systems are otherwise negative except as noted in HPI.  ?  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          Electronically signed by : DARREL JOHNSON NP; Jul 1 2024  2:43PM Eastern Standard Time (Author)  ? Reason For Visit-[facility] TELE       Tele visit -[facility].  ?  History of Present Illness          ROBERT SLAUGHTER is a 29 year old left hand dominant female. 28 y/o female with left dorsal wrist pain radially secondary to a ganglion cyst. She was seen previously by Dr. Contreras, who drained the cyst. She reports that she had temporary symptom relief for about one month after the aspiration, but her cyst and pain returned. She has had pain for a number of years. Failed conservative therapy and was advised surgery.  ?  Active Problems  Ganglion cyst of wrist (727.41) (M67.439)  Headache (784.0) (R51.9)  Migraine without status migrainosus, not intractable, unspecified migraine type (346.90)  (G43.909)  Neck pain, musculoskeletal (723.1) (M54.2)           ?  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  Family History  Family history of diabetes mellitus (V18.0) (Z83.3) : Mother  Family history of migraine headaches (V17.2) (Z82.0) : Mother       Social History  No alcohol use  Non-smoker (V49.89) (Z78.9)       ?  Current Meds  Co Q 10 100 MG Oral Capsule  Excedrin Migraine TABS as needed  Magnesium CAPS 1xday  Melatonin CAPS PRN  Nurtec 75 MG Oral Tablet Disintegrating; take 1 tab every other day       ?  Allergies  No Known Drug Allergies       ?  Review of Systems          Hand Dominance: left.    Musculoskeletal: arthralgia, joint pain and joint swelling . left wrist.    Eyes:. vision changes/light headedness with migraines.    Neurological: headache and dizziness with migraines  Constitutional, Eyes, ENT, Cardiovascular, Respiratory, Gastrointestinal, Genitourinary, Integumentary, Psychiatric, Endocrine and Heme/Lymph review of systems are otherwise negative except as noted in - y/0 male/female in no acute distress presented to [facility] for Pre surgical testing and evaluation for planned surgery by -. C/O pain/ discomfort to - area x - years. failed conservative therapy, seen by DR Live advised surgery.  Physical Exam       - Constitutional: healthy no distress  - Psych: Patient is alert and oriented to person, place and time. Patient has a normal mood and affect.  - Cardiovascular: Normal heart sounds, No murmur Normal pulses throughout the upper extremities. No significant varicosities are noted in the upper extremities.  Musculoskeletal-  - Neuro: Strength and sensation are intact throughout the upper extremities. Patient has normal coordination.  - Respiratory: Lungs -clear, Patient exhibits no evidence of shortness of breath or difficulty breathing.  - Skin: No rashes, lesions, or other abnormalities are noted in the upper extremities.    ?  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          Electronically signed by : DARREL JOHNSON NP; Jul 1 2024  2:43PM Eastern Standard Time (Author)  ?

## 2024-07-10 ENCOUNTER — TRANSCRIPTION ENCOUNTER (OUTPATIENT)
Age: 29
End: 2024-07-10

## 2024-07-11 ENCOUNTER — RESULT REVIEW (OUTPATIENT)
Age: 29
End: 2024-07-11

## 2024-07-11 ENCOUNTER — APPOINTMENT (OUTPATIENT)
Dept: ORTHOPEDIC SURGERY | Facility: HOSPITAL | Age: 29
End: 2024-07-11

## 2024-07-11 ENCOUNTER — OUTPATIENT (OUTPATIENT)
Dept: OUTPATIENT SERVICES | Facility: HOSPITAL | Age: 29
LOS: 1 days | End: 2024-07-11
Payer: COMMERCIAL

## 2024-07-11 ENCOUNTER — TRANSCRIPTION ENCOUNTER (OUTPATIENT)
Age: 29
End: 2024-07-11

## 2024-07-11 VITALS
OXYGEN SATURATION: 100 % | DIASTOLIC BLOOD PRESSURE: 66 MMHG | SYSTOLIC BLOOD PRESSURE: 130 MMHG | HEART RATE: 66 BPM | RESPIRATION RATE: 15 BRPM

## 2024-07-11 VITALS
RESPIRATION RATE: 25 BRPM | HEART RATE: 74 BPM | TEMPERATURE: 97 F | SYSTOLIC BLOOD PRESSURE: 121 MMHG | HEIGHT: 59 IN | DIASTOLIC BLOOD PRESSURE: 75 MMHG | WEIGHT: 135.8 LBS | OXYGEN SATURATION: 100 %

## 2024-07-11 DIAGNOSIS — Z98.890 OTHER SPECIFIED POSTPROCEDURAL STATES: Chronic | ICD-10-CM

## 2024-07-11 DIAGNOSIS — M67.439 GANGLION, UNSPECIFIED WRIST: ICD-10-CM

## 2024-07-11 LAB
HCG UR QL: NEGATIVE — SIGNIFICANT CHANGE UP
HCT VFR BLD CALC: 35.1 % — SIGNIFICANT CHANGE UP (ref 34.5–45)
HGB BLD-MCNC: 12.2 G/DL — SIGNIFICANT CHANGE UP (ref 11.5–15.5)
MCHC RBC-ENTMCNC: 30.9 PG — SIGNIFICANT CHANGE UP (ref 27–34)
MCHC RBC-ENTMCNC: 34.8 GM/DL — SIGNIFICANT CHANGE UP (ref 32–36)
MCV RBC AUTO: 88.9 FL — SIGNIFICANT CHANGE UP (ref 80–100)
NRBC # BLD: 0 /100 WBCS — SIGNIFICANT CHANGE UP (ref 0–0)
PLATELET # BLD AUTO: 245 K/UL — SIGNIFICANT CHANGE UP (ref 150–400)
RBC # BLD: 3.95 M/UL — SIGNIFICANT CHANGE UP (ref 3.8–5.2)
RBC # FLD: 11.9 % — SIGNIFICANT CHANGE UP (ref 10.3–14.5)
WBC # BLD: 6.11 K/UL — SIGNIFICANT CHANGE UP (ref 3.8–10.5)
WBC # FLD AUTO: 6.11 K/UL — SIGNIFICANT CHANGE UP (ref 3.8–10.5)

## 2024-07-11 PROCEDURE — 88304 TISSUE EXAM BY PATHOLOGIST: CPT

## 2024-07-11 PROCEDURE — 85027 COMPLETE CBC AUTOMATED: CPT

## 2024-07-11 PROCEDURE — 36415 COLL VENOUS BLD VENIPUNCTURE: CPT

## 2024-07-11 PROCEDURE — 25111 REMOVE WRIST TENDON LESION: CPT | Mod: LT

## 2024-07-11 PROCEDURE — 81025 URINE PREGNANCY TEST: CPT

## 2024-07-11 PROCEDURE — 88304 TISSUE EXAM BY PATHOLOGIST: CPT | Mod: 26

## 2024-07-11 RX ORDER — APREPITANT 125MG-80MG
40 KIT ORAL ONCE
Refills: 0 | Status: COMPLETED | OUTPATIENT
Start: 2024-07-11 | End: 2024-07-11

## 2024-07-11 RX ORDER — OXYCODONE HYDROCHLORIDE 100 MG/5ML
5 SOLUTION ORAL ONCE
Refills: 0 | Status: DISCONTINUED | OUTPATIENT
Start: 2024-07-11 | End: 2024-07-11

## 2024-07-11 RX ORDER — DEXTROSE MONOHYDRATE AND SODIUM CHLORIDE 5; .3 G/100ML; G/100ML
1000 INJECTION, SOLUTION INTRAVENOUS
Refills: 0 | Status: DISCONTINUED | OUTPATIENT
Start: 2024-07-11 | End: 2024-07-11

## 2024-07-11 RX ORDER — HYDROMORPHONE HCL 0.2 MG/ML
0.2 INJECTION, SOLUTION INTRAVENOUS
Refills: 0 | Status: DISCONTINUED | OUTPATIENT
Start: 2024-07-11 | End: 2024-07-11

## 2024-07-11 RX ORDER — CEFAZOLIN 10 G/1
2000 INJECTION, POWDER, FOR SOLUTION INTRAVENOUS ONCE
Refills: 0 | Status: COMPLETED | OUTPATIENT
Start: 2024-07-11 | End: 2024-07-11

## 2024-07-11 RX ORDER — RIMEGEPANT SULFATE 75 MG/75MG
1 TABLET, ORALLY DISINTEGRATING ORAL
Refills: 0 | DISCHARGE

## 2024-07-11 RX ORDER — ONDANSETRON HYDROCHLORIDE 2 MG/ML
4 INJECTION INTRAMUSCULAR; INTRAVENOUS ONCE
Refills: 0 | Status: COMPLETED | OUTPATIENT
Start: 2024-07-11 | End: 2024-07-11

## 2024-07-11 RX ORDER — HYDROMORPHONE HCL 0.2 MG/ML
0.5 INJECTION, SOLUTION INTRAVENOUS
Refills: 0 | Status: DISCONTINUED | OUTPATIENT
Start: 2024-07-11 | End: 2024-07-11

## 2024-07-11 RX ADMIN — APREPITANT 40 MILLIGRAM(S): KIT at 09:26

## 2024-07-11 RX ADMIN — Medication 1 APPLICATION(S): at 10:11

## 2024-07-11 RX ADMIN — DEXTROSE MONOHYDRATE AND SODIUM CHLORIDE 75 MILLILITER(S): 5; .3 INJECTION, SOLUTION INTRAVENOUS at 13:00

## 2024-07-11 RX ADMIN — ONDANSETRON HYDROCHLORIDE 4 MILLIGRAM(S): 2 INJECTION INTRAMUSCULAR; INTRAVENOUS at 13:00

## 2024-07-11 NOTE — ASU PATIENT PROFILE, ADULT - FALL HARM RISK - ATTEMPT OOB
Attempted to contact pt's parent to confirm tomorrow's appt; to no avail. Left voice message to return call to confirm.   No

## 2024-07-11 NOTE — ASU DISCHARGE PLAN (ADULT/PEDIATRIC) - CARE PROVIDER_API CALL
Moreno Johnson)  Surgery of the Hand  [address], [address]  [place] [address]  Phone: [phone]  Fax: [phone]  Scheduled Appointment: 07/17/2024

## 2024-07-18 ENCOUNTER — APPOINTMENT (OUTPATIENT)
Dept: ORTHOPEDIC SURGERY | Facility: CLINIC | Age: 29
End: 2024-07-18
Payer: COMMERCIAL

## 2024-07-18 DIAGNOSIS — M67.439 GANGLION, UNSPECIFIED WRIST: ICD-10-CM

## 2024-07-18 PROBLEM — Z78.9 OTHER SPECIFIED HEALTH STATUS: Chronic | Status: ACTIVE | Noted: 2024-07-11

## 2024-07-18 PROCEDURE — 99024 POSTOP FOLLOW-UP VISIT: CPT

## 2024-08-01 ENCOUNTER — NON-APPOINTMENT (OUTPATIENT)
Age: 29
End: 2024-08-01

## 2024-08-01 NOTE — HISTORY OF PRESENT ILLNESS
[de-identified] : 29 days postoperative. [de-identified] : 29 days status post excision of left wrist dorsal ganglion cyst.  Date of surgery: 7/11/2024.  She is  [de-identified] : Examination of her left wrist demonstrates her incision to be healing well.  She has full flexion and extension of the digits.  She is neurovascularly intact distally. [de-identified] : Stable, 29 days postoperative. [de-identified] : She was instructed on continue range of motion exercises and scar massage and desensitization.  She will follow-up

## 2024-08-09 ENCOUNTER — APPOINTMENT (OUTPATIENT)
Dept: ORTHOPEDIC SURGERY | Facility: CLINIC | Age: 29
End: 2024-08-09

## 2024-08-09 PROCEDURE — 99024 POSTOP FOLLOW-UP VISIT: CPT

## 2024-08-09 NOTE — HISTORY OF PRESENT ILLNESS
[de-identified] : 29 days postoperative. [de-identified] : 29 days status post excision of left wrist dorsal ganglion cyst.  Date of surgery: 7/11/2024.  She is overall doing well today. She states that she has some tingling when she massages her incision site.  [de-identified] : Examination of her left wrist demonstrates her incision to be healing well.  She has full flexion and extension of the digits.  There is residual tenderness and she does have some pain with terminal flexion and extension.  She is neurovascularly intact distally. [de-identified] : Stable, 29 days postoperative.  She has some residual stiffness and pain. [de-identified] : She was instructed on continue range of motion exercises. I told her to discontinue massaging her incision site as she has tingling when doing so.   She was also provided with the appropriate referral to hand therapy. She will follow-up in 3 to 4 weeks.  With regard to work, a note was provided for her to return to work in 10 days.

## 2024-08-09 NOTE — RETURN TO WORK/SCHOOL
[FreeTextEntry1] : She was evaluated and may return to work on August 19,2024. [FreeTextEntry2] : Moreno Johnson.

## 2024-08-09 NOTE — HISTORY OF PRESENT ILLNESS
[de-identified] : 29 days postoperative. [de-identified] : 29 days status post excision of left wrist dorsal ganglion cyst.  Date of surgery: 7/11/2024.  She is overall doing well today. She states that she has some tingling when she massages her incision site.  [de-identified] : Examination of her left wrist demonstrates her incision to be healing well.  She has full flexion and extension of the digits.  There is residual tenderness and she does have some pain with terminal flexion and extension.  She is neurovascularly intact distally. [de-identified] : Stable, 29 days postoperative.  She has some residual stiffness and pain. [de-identified] : She was instructed on continue range of motion exercises. I told her to discontinue massaging her incision site as she has tingling when doing so.   She was also provided with the appropriate referral to hand therapy. She will follow-up in 3 to 4 weeks.  With regard to work, a note was provided for her to return to work in 10 days.

## 2024-08-28 ENCOUNTER — NON-APPOINTMENT (OUTPATIENT)
Age: 29
End: 2024-08-28

## 2024-08-28 NOTE — HISTORY OF PRESENT ILLNESS
[de-identified] : 57 days postoperative. [de-identified] : 57 days status post excision of left wrist dorsal ganglion cyst.  Date of surgery: 7/11/2024.  She is  [de-identified] : Examination of her left wrist demonstrates her incision to be healing well.  She has full flexion and extension of the digits.  There is residual tenderness and she does have some pain with terminal flexion and extension.  She is neurovascularly intact distally. [de-identified] : Stable, 57 days postoperative. [de-identified] : She was instructed on continued range of motion exercises.  She will advance her activities, according to her symptoms.  She will follow-up in 4 weeks if needed.

## 2024-08-30 NOTE — HISTORY OF PRESENT ILLNESS
[de-identified] : 57 days postoperative. [de-identified] : 57 days status post excision of left wrist dorsal ganglion cyst.  Date of surgery: 7/11/2024.  She is  [de-identified] : Examination of her left wrist demonstrates her incision to be healing well.  She has full flexion and extension of the digits.  There is residual tenderness and she does have some pain with terminal flexion and extension.  She is neurovascularly intact distally. [de-identified] : Stable, 57 days postoperative. [de-identified] : She was instructed on continued range of motion exercises.  She will advance her activities, according to her symptoms.  She will follow-up in 4 weeks if needed.

## 2024-09-01 ENCOUNTER — NON-APPOINTMENT (OUTPATIENT)
Age: 29
End: 2024-09-01

## 2024-09-05 ENCOUNTER — NON-APPOINTMENT (OUTPATIENT)
Age: 29
End: 2024-09-05

## 2024-09-06 ENCOUNTER — APPOINTMENT (OUTPATIENT)
Dept: ORTHOPEDIC SURGERY | Facility: CLINIC | Age: 29
End: 2024-09-06

## 2024-09-06 DIAGNOSIS — M67.439 GANGLION, UNSPECIFIED WRIST: ICD-10-CM

## 2024-10-01 ENCOUNTER — APPOINTMENT (OUTPATIENT)
Dept: ORTHOPEDIC SURGERY | Facility: CLINIC | Age: 29
End: 2024-10-01
Payer: COMMERCIAL

## 2024-10-01 DIAGNOSIS — M67.439 GANGLION, UNSPECIFIED WRIST: ICD-10-CM

## 2024-10-01 PROCEDURE — 99024 POSTOP FOLLOW-UP VISIT: CPT

## 2024-10-01 NOTE — ADDENDUM
[FreeTextEntry1] :  I, Salinas Gillespie, acted solely as a scribe for Dr. Johnson on this date on 10/01/2024.

## 2024-10-01 NOTE — HISTORY OF PRESENT ILLNESS
[de-identified] : Less than 3 months postoperative. [de-identified] : Less than 3 months status post excision of left wrist dorsal ganglion cyst.  Date of surgery: 7/11/2024  She is overall doing well. With regard to her left wrist cyst, she returns today because she completed her course of physical therapy and would like an updated prescription. She reports that her range of motion has improved since her cyst was removed. She reports some numbness and tingling. She rates her pain as an 8/10.  She also reports that her volar ganglion cyst, which was noted on MRI, it is causing her some pain and irritation.  [de-identified] : Examination of her left wrist demonstrates her incision to be healing well.  She has full flexion and extension of the wrist and digits.  There is no tenderness along the incision.  There is a negative Tinel overlying the dorsal radial sensory nerve branch.  She has some tenderness volarly in the region of the volar ganglion cyst which is not palpable.  She has normal sensation distally along the dorsal radial sensory nerve branch. [de-identified] : Stable, less than 3 months postoperative, with residual symptoms secondary to the volar ganglion cyst which is quite small and is causing her symptoms. [de-identified] : With regard to her left wrist cyst that was removed, an updated prescription for therapy was provided today.  With regard to the volar ganglion cyst, I told her that it is quite small and not palpable, so it is not amenable to aspiration.  I did tell her that most likely it is aggravated from the therapy and after she stops this and resumes regular activities, hopefully her symptoms will resolve.  I told her that if her symptoms become more problematic, it can be surgically removed in the future, but this is only if her symptoms warrant.  She will follow-up in 2 months if needed, according to her symptoms.

## 2024-10-01 NOTE — ADDENDUM
[FreeTextEntry1] :  I, Salians Gillespie, acted solely as a scribe for Dr. Johnson on this date on 10/01/2024.

## 2024-10-01 NOTE — END OF VISIT
[FreeTextEntry3] : This note was written by Salinas Gillespie on 10/01/2024 acting solely as a scribe for Dr. Moreno Johnson.   All medical record entries made by the Scribe were at my, Dr. Moreno Johnson, direction and personally dictated by me on 10/01/2024. I have personally reviewed the chart and agree that the record accurately reflects my personal performance of the history, physical exam, assessment and plan.

## 2024-10-01 NOTE — HISTORY OF PRESENT ILLNESS
[de-identified] : Less than 3 months postoperative. [de-identified] : Less than 3 months status post excision of left wrist dorsal ganglion cyst.  Date of surgery: 7/11/2024  She is overall doing well. With regard to her left wrist cyst, she returns today because she completed her course of physical therapy and would like an updated prescription. She reports that her range of motion has improved since her cyst was removed. She reports some numbness and tingling. She rates her pain as an 8/10.  She also reports that her volar ganglion cyst, which was noted on MRI, it is causing her some pain and irritation.  [de-identified] : Examination of her left wrist demonstrates her incision to be healing well.  She has full flexion and extension of the wrist and digits.  There is no tenderness along the incision.  There is a negative Tinel overlying the dorsal radial sensory nerve branch.  She has some tenderness volarly in the region of the volar ganglion cyst which is not palpable.  She has normal sensation distally along the dorsal radial sensory nerve branch. [de-identified] : Stable, less than 3 months postoperative, with residual symptoms secondary to the volar ganglion cyst which is quite small and is causing her symptoms. [de-identified] : With regard to her left wrist cyst that was removed, an updated prescription for therapy was provided today.  With regard to the volar ganglion cyst, I told her that it is quite small and not palpable, so it is not amenable to aspiration.  I did tell her that most likely it is aggravated from the therapy and after she stops this and resumes regular activities, hopefully her symptoms will resolve.  I told her that if her symptoms become more problematic, it can be surgically removed in the future, but this is only if her symptoms warrant.  She will follow-up in 2 months if needed, according to her symptoms.

## 2024-12-30 ENCOUNTER — APPOINTMENT (OUTPATIENT)
Dept: ORTHOPEDIC SURGERY | Facility: CLINIC | Age: 29
End: 2024-12-30
Payer: COMMERCIAL

## 2024-12-30 ENCOUNTER — NON-APPOINTMENT (OUTPATIENT)
Age: 29
End: 2024-12-30

## 2024-12-30 DIAGNOSIS — M70.60 TROCHANTERIC BURSITIS, UNSPECIFIED HIP: ICD-10-CM

## 2024-12-30 DIAGNOSIS — M25.551 PAIN IN RIGHT HIP: ICD-10-CM

## 2024-12-30 PROCEDURE — 73502 X-RAY EXAM HIP UNI 2-3 VIEWS: CPT | Mod: RT

## 2024-12-30 PROCEDURE — 99214 OFFICE O/P EST MOD 30 MIN: CPT

## 2025-01-13 ENCOUNTER — NON-APPOINTMENT (OUTPATIENT)
Age: 30
End: 2025-01-13

## 2025-02-12 ENCOUNTER — APPOINTMENT (OUTPATIENT)
Dept: ORTHOPEDIC SURGERY | Facility: CLINIC | Age: 30
End: 2025-02-12
Payer: COMMERCIAL

## 2025-02-12 VITALS
HEART RATE: 73 BPM | BODY MASS INDEX: 27.42 KG/M2 | DIASTOLIC BLOOD PRESSURE: 86 MMHG | WEIGHT: 136 LBS | SYSTOLIC BLOOD PRESSURE: 128 MMHG | HEIGHT: 59 IN

## 2025-02-12 DIAGNOSIS — M67.439 GANGLION, UNSPECIFIED WRIST: ICD-10-CM

## 2025-02-12 DIAGNOSIS — G56.02 CARPAL TUNNEL SYNDROME, LEFT UPPER LIMB: ICD-10-CM

## 2025-02-12 PROCEDURE — 99203 OFFICE O/P NEW LOW 30 MIN: CPT

## 2025-02-17 ENCOUNTER — APPOINTMENT (OUTPATIENT)
Dept: MRI IMAGING | Facility: CLINIC | Age: 30
End: 2025-02-17
Payer: COMMERCIAL

## 2025-02-17 ENCOUNTER — OUTPATIENT (OUTPATIENT)
Dept: OUTPATIENT SERVICES | Facility: HOSPITAL | Age: 30
LOS: 1 days | End: 2025-02-17
Payer: COMMERCIAL

## 2025-02-17 DIAGNOSIS — Z98.890 OTHER SPECIFIED POSTPROCEDURAL STATES: Chronic | ICD-10-CM

## 2025-02-17 DIAGNOSIS — M67.439 GANGLION, UNSPECIFIED WRIST: ICD-10-CM

## 2025-02-17 PROCEDURE — 73221 MRI JOINT UPR EXTREM W/O DYE: CPT

## 2025-02-17 PROCEDURE — 73221 MRI JOINT UPR EXTREM W/O DYE: CPT | Mod: 26,LT

## 2025-03-28 ENCOUNTER — APPOINTMENT (OUTPATIENT)
Dept: PHYSICAL MEDICINE AND REHAB | Facility: CLINIC | Age: 30
End: 2025-03-28
Payer: COMMERCIAL

## 2025-03-28 DIAGNOSIS — G56.02 CARPAL TUNNEL SYNDROME, LEFT UPPER LIMB: ICD-10-CM

## 2025-03-28 PROCEDURE — 95908 NRV CNDJ TST 3-4 STUDIES: CPT

## 2025-04-07 ENCOUNTER — NON-APPOINTMENT (OUTPATIENT)
Age: 30
End: 2025-04-07

## 2025-04-07 ENCOUNTER — APPOINTMENT (OUTPATIENT)
Dept: ORTHOPEDIC SURGERY | Facility: CLINIC | Age: 30
End: 2025-04-07
Payer: COMMERCIAL

## 2025-04-07 DIAGNOSIS — G56.02 CARPAL TUNNEL SYNDROME, LEFT UPPER LIMB: ICD-10-CM

## 2025-04-07 DIAGNOSIS — M67.439 GANGLION, UNSPECIFIED WRIST: ICD-10-CM

## 2025-04-07 PROCEDURE — 99214 OFFICE O/P EST MOD 30 MIN: CPT

## 2025-04-10 ENCOUNTER — APPOINTMENT (OUTPATIENT)
Dept: NEUROLOGY | Facility: CLINIC | Age: 30
End: 2025-04-10

## 2025-04-10 ENCOUNTER — OUTPATIENT (OUTPATIENT)
Dept: OUTPATIENT SERVICES | Facility: HOSPITAL | Age: 30
LOS: 1 days | End: 2025-04-10
Payer: COMMERCIAL

## 2025-04-10 VITALS
TEMPERATURE: 99 F | DIASTOLIC BLOOD PRESSURE: 85 MMHG | OXYGEN SATURATION: 98 % | RESPIRATION RATE: 15 BRPM | HEART RATE: 62 BPM | HEIGHT: 59 IN | WEIGHT: 145.06 LBS | SYSTOLIC BLOOD PRESSURE: 126 MMHG

## 2025-04-10 DIAGNOSIS — Z01.818 ENCOUNTER FOR OTHER PREPROCEDURAL EXAMINATION: ICD-10-CM

## 2025-04-10 DIAGNOSIS — Z98.890 OTHER SPECIFIED POSTPROCEDURAL STATES: Chronic | ICD-10-CM

## 2025-04-10 DIAGNOSIS — M67.432 GANGLION, LEFT WRIST: ICD-10-CM

## 2025-04-10 DIAGNOSIS — M67.439 GANGLION, UNSPECIFIED WRIST: ICD-10-CM

## 2025-04-10 PROBLEM — Z78.9 OTHER SPECIFIED HEALTH STATUS: Chronic | Status: INACTIVE | Noted: 2024-07-11 | Resolved: 2025-04-10

## 2025-04-10 PROCEDURE — G0463: CPT

## 2025-04-10 PROCEDURE — 85027 COMPLETE CBC AUTOMATED: CPT

## 2025-04-10 NOTE — H&P PST ADULT - HISTORY OF PRESENT ILLNESS
flu 2024 30 yr old female with hx of Migraines  (denies recent migraine) and pineal cyst. Noted ganglion cyst left  wrist and numbness/tingling left wrist/hand/ Work up has cyst and carpal tunnel syndrome referred for surgery.     flu 2024

## 2025-04-10 NOTE — H&P PST ADULT - ASSESSMENT
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

## 2025-04-10 NOTE — H&P PST ADULT - NSICDXPASTMEDICALHX_GEN_ALL_CORE_FT
PAST MEDICAL HISTORY:  Ganglion cyst of wrist, left     Left carpal tunnel syndrome     Right hip pain      PAST MEDICAL HISTORY:  Ganglion cyst of wrist, left     Left carpal tunnel syndrome     Migraines     Pineal gland cyst     Right hip pain

## 2025-04-15 PROBLEM — E34.8 OTHER SPECIFIED ENDOCRINE DISORDERS: Chronic | Status: ACTIVE | Noted: 2025-04-10

## 2025-04-15 PROBLEM — G56.02 CARPAL TUNNEL SYNDROME, LEFT UPPER LIMB: Chronic | Status: ACTIVE | Noted: 2025-04-10

## 2025-04-15 PROBLEM — M25.551 PAIN IN RIGHT HIP: Chronic | Status: ACTIVE | Noted: 2025-04-10

## 2025-04-15 PROBLEM — M67.432 GANGLION, LEFT WRIST: Chronic | Status: ACTIVE | Noted: 2025-04-10

## 2025-04-15 PROBLEM — G43.909 MIGRAINE, UNSPECIFIED, NOT INTRACTABLE, WITHOUT STATUS MIGRAINOSUS: Chronic | Status: ACTIVE | Noted: 2025-04-10

## 2025-04-17 ENCOUNTER — TRANSCRIPTION ENCOUNTER (OUTPATIENT)
Age: 30
End: 2025-04-17

## 2025-04-17 ENCOUNTER — OUTPATIENT (OUTPATIENT)
Dept: OUTPATIENT SERVICES | Facility: HOSPITAL | Age: 30
LOS: 1 days | End: 2025-04-17
Payer: COMMERCIAL

## 2025-04-17 ENCOUNTER — RESULT REVIEW (OUTPATIENT)
Age: 30
End: 2025-04-17

## 2025-04-17 ENCOUNTER — APPOINTMENT (OUTPATIENT)
Dept: ORTHOPEDIC SURGERY | Facility: HOSPITAL | Age: 30
End: 2025-04-17

## 2025-04-17 VITALS
TEMPERATURE: 98 F | DIASTOLIC BLOOD PRESSURE: 81 MMHG | HEART RATE: 73 BPM | RESPIRATION RATE: 16 BRPM | SYSTOLIC BLOOD PRESSURE: 119 MMHG | HEIGHT: 59 IN | OXYGEN SATURATION: 99 % | WEIGHT: 145.06 LBS

## 2025-04-17 VITALS
HEART RATE: 75 BPM | DIASTOLIC BLOOD PRESSURE: 53 MMHG | OXYGEN SATURATION: 98 % | SYSTOLIC BLOOD PRESSURE: 102 MMHG | RESPIRATION RATE: 16 BRPM

## 2025-04-17 DIAGNOSIS — Z98.890 OTHER SPECIFIED POSTPROCEDURAL STATES: Chronic | ICD-10-CM

## 2025-04-17 DIAGNOSIS — M67.439 GANGLION, UNSPECIFIED WRIST: ICD-10-CM

## 2025-04-17 PROCEDURE — 88304 TISSUE EXAM BY PATHOLOGIST: CPT | Mod: 26

## 2025-04-17 PROCEDURE — 64721 CARPAL TUNNEL SURGERY: CPT | Mod: LT

## 2025-04-17 PROCEDURE — 25111 REMOVE WRIST TENDON LESION: CPT | Mod: XU,LT

## 2025-04-17 PROCEDURE — 25111 REMOVE WRIST TENDON LESION: CPT | Mod: 59,LT

## 2025-04-17 PROCEDURE — 88304 TISSUE EXAM BY PATHOLOGIST: CPT

## 2025-04-17 RX ORDER — OXYCODONE HYDROCHLORIDE 30 MG/1
1 TABLET ORAL
Qty: 5 | Refills: 0
Start: 2025-04-17

## 2025-04-17 RX ORDER — LIDOCAINE HCL/PF 10 MG/ML
0.2 VIAL (ML) INJECTION ONCE
Refills: 0 | Status: COMPLETED | OUTPATIENT
Start: 2025-04-17 | End: 2025-04-17

## 2025-04-17 RX ORDER — METOCLOPRAMIDE HCL 10 MG
10 TABLET ORAL ONCE
Refills: 0 | Status: COMPLETED | OUTPATIENT
Start: 2025-04-17 | End: 2025-04-17

## 2025-04-17 RX ORDER — ACETAMINOPHEN/CAFFEINE 500MG-65MG
0 TABLET ORAL
Refills: 0 | DISCHARGE

## 2025-04-17 RX ORDER — ONDANSETRON HCL/PF 4 MG/2 ML
4 VIAL (ML) INJECTION ONCE
Refills: 0 | Status: COMPLETED | OUTPATIENT
Start: 2025-04-17 | End: 2025-04-17

## 2025-04-17 RX ORDER — SODIUM CHLORIDE 9 G/1000ML
1000 INJECTION, SOLUTION INTRAVENOUS
Refills: 0 | Status: ACTIVE | OUTPATIENT
Start: 2025-04-17 | End: 2026-03-16

## 2025-04-17 RX ORDER — FENTANYL CITRATE-0.9 % NACL/PF 100MCG/2ML
25 SYRINGE (ML) INTRAVENOUS
Refills: 0 | Status: DISCONTINUED | OUTPATIENT
Start: 2025-04-17 | End: 2025-04-17

## 2025-04-17 RX ADMIN — SODIUM CHLORIDE 100 MILLILITER(S): 9 INJECTION, SOLUTION INTRAVENOUS at 09:06

## 2025-04-17 RX ADMIN — Medication 4 MILLIGRAM(S): at 12:16

## 2025-04-17 RX ADMIN — Medication 10 MILLIGRAM(S): at 12:17

## 2025-04-17 RX ADMIN — Medication 1 APPLICATION(S): at 09:06

## 2025-04-17 NOTE — ASU PATIENT PROFILE, ADULT - NSICDXPASTMEDICALHX_GEN_ALL_CORE_FT
PAST MEDICAL HISTORY:  Ganglion cyst of wrist, left     Left carpal tunnel syndrome     Migraines     Pineal gland cyst     Right hip pain

## 2025-04-17 NOTE — BRIEF OPERATIVE NOTE - NSICDXBRIEFPREOP_GEN_ALL_CORE_FT
PRE-OP DIAGNOSIS:  Ganglion cyst of volar aspect of wrist 17-Apr-2025 12:02:27  Marisol Rice  Left carpal tunnel syndrome 17-Apr-2025 12:02:34  Marisol Rice

## 2025-04-17 NOTE — ASU DISCHARGE PLAN (ADULT/PEDIATRIC) - FINANCIAL ASSISTANCE
[facility] provides services at a reduced cost to those who are determined to be eligible through [facility]’s financial assistance program. Information regarding [facility]’s financial assistance program can be found by going to [email or web address] or by calling [phone].

## 2025-04-17 NOTE — BRIEF OPERATIVE NOTE - NSICDXBRIEFPOSTOP_GEN_ALL_CORE_FT
POST-OP DIAGNOSIS:  Ganglion cyst of volar aspect of wrist 17-Apr-2025 12:02:48  Marisol Rice  Left carpal tunnel syndrome 17-Apr-2025 12:02:54  Marisol Rice

## 2025-04-17 NOTE — BRIEF OPERATIVE NOTE - NSICDXBRIEFPROCEDURE_GEN_ALL_CORE_FT
PROCEDURES:  Release, carpal tunnel 17-Apr-2025 12:02:06  Mairsol Rice  Excision, ganglion cyst, wrist, volar 17-Apr-2025 12:02:15  Marisol Rice

## 2025-04-17 NOTE — ASU DISCHARGE PLAN (ADULT/PEDIATRIC) - NURSING INSTRUCTIONS
OK to take Tylenol/Acetaminophen at 5:00 pm_ for pain and every 6 hours after as needed. OK to take Motrin/Ibuprofen at _anytime for pain and every 6 hours after as needed.

## 2025-04-17 NOTE — ASU DISCHARGE PLAN (ADULT/PEDIATRIC) - CARE PROVIDER_API CALL
Valencia Sommers  Surgery [facility] Hand  [address], [address]  [place] [address]  Phone: [phone]  Fax: [phone]  Follow Up Time:

## 2025-04-17 NOTE — ASU PREOP CHECKLIST - SPO2 (%)
This writer attempted to contact patient's mother via phone to offer an appointment per Dr. Dedrick Medrano. This writer was not able to reach patient's mother at this time a voicemail was left advising patient's mother to contact the office for further assistance. 99

## 2025-04-17 NOTE — ASU DISCHARGE PLAN (ADULT/PEDIATRIC) - ASU DC SPECIAL INSTRUCTIONSFT
- Please keep Left upper extremity splint clean, dry, and intact until your first follow up appt  - You may take tylenol and ibuprofen as needed for pain; oxycodone has been sent to your pharmacy to be taken for severe pain  - Please follow up with Dr. Sommers in 10-14 days, call office to make an appointment

## 2025-04-21 ENCOUNTER — APPOINTMENT (OUTPATIENT)
Dept: ORTHOPEDIC SURGERY | Facility: CLINIC | Age: 30
End: 2025-04-21

## 2025-04-21 PROCEDURE — 29125 APPL SHORT ARM SPLINT STATIC: CPT | Mod: 58,LT

## 2025-04-21 PROCEDURE — 99024 POSTOP FOLLOW-UP VISIT: CPT

## 2025-04-22 LAB — SURGICAL PATHOLOGY STUDY: SIGNIFICANT CHANGE UP

## 2025-05-02 ENCOUNTER — APPOINTMENT (OUTPATIENT)
Dept: ORTHOPEDIC SURGERY | Facility: CLINIC | Age: 30
End: 2025-05-02
Payer: COMMERCIAL

## 2025-05-02 PROCEDURE — 99024 POSTOP FOLLOW-UP VISIT: CPT

## 2025-05-16 RX ORDER — METHYLPREDNISOLONE 4 MG/1
4 TABLET ORAL
Qty: 1 | Refills: 0 | Status: ACTIVE | COMMUNITY
Start: 2025-05-16 | End: 1900-01-01

## 2025-05-19 DIAGNOSIS — G56.02 CARPAL TUNNEL SYNDROME, LEFT UPPER LIMB: ICD-10-CM

## 2025-05-19 DIAGNOSIS — M67.439 GANGLION, UNSPECIFIED WRIST: ICD-10-CM

## 2025-05-19 RX ORDER — METHYLPREDNISOLONE 4 MG/1
4 TABLET ORAL
Qty: 1 | Refills: 0 | Status: ACTIVE | COMMUNITY
Start: 2025-05-19 | End: 1900-01-01

## 2025-05-20 NOTE — RETURN TO WORK/SCHOOL
10:00 [FreeTextEntry1] : She was evaluated and may return to work on August 19,2024. [FreeTextEntry2] : Moreno Johnson.

## 2025-05-28 ENCOUNTER — APPOINTMENT (OUTPATIENT)
Dept: ORTHOPEDIC SURGERY | Facility: CLINIC | Age: 30
End: 2025-05-28

## 2025-05-28 VITALS — BODY MASS INDEX: 29.64 KG/M2 | WEIGHT: 147 LBS | HEIGHT: 59 IN

## 2025-05-28 PROCEDURE — 99024 POSTOP FOLLOW-UP VISIT: CPT

## 2025-07-28 ENCOUNTER — APPOINTMENT (OUTPATIENT)
Dept: ORTHOPEDIC SURGERY | Facility: CLINIC | Age: 30
End: 2025-07-28

## 2025-08-27 ENCOUNTER — APPOINTMENT (OUTPATIENT)
Dept: NEUROLOGY | Facility: CLINIC | Age: 30
End: 2025-08-27

## 2025-08-27 DIAGNOSIS — G43.909 MIGRAINE, UNSPECIFIED, NOT INTRACTABLE, W/OUT STATUS MIGRAINOSUS: ICD-10-CM

## 2025-08-27 DIAGNOSIS — R51.9 HEADACHE, UNSPECIFIED: ICD-10-CM

## 2025-08-27 DIAGNOSIS — E34.8 OTHER SPECIFIED ENDOCRINE DISORDERS: ICD-10-CM

## 2025-08-27 PROCEDURE — 99243 OFF/OP CNSLTJ NEW/EST LOW 30: CPT | Mod: 95

## 2025-09-05 RX ORDER — ATOGEPANT 60 MG/1
60 TABLET ORAL
Qty: 30 | Refills: 6 | Status: ACTIVE | COMMUNITY
Start: 2025-08-27

## (undated) DEVICE — DRSG ACE BANDAGE 2"

## (undated) DEVICE — SUT MONOCRYL 4-0 18" P-3 UNDYED

## (undated) DEVICE — WARMING BLANKET FULL ADULT

## (undated) DEVICE — TOURNIQUET ESMARK 4"

## (undated) DEVICE — DRSG DERMABOND 0.7ML

## (undated) DEVICE — MEDICATION LABELS W MARKER

## (undated) DEVICE — SPECIMEN CONTAINER 100ML

## (undated) DEVICE — DRSG KLING 4"

## (undated) DEVICE — DRSG STERISTRIPS 0.5 X 4"

## (undated) DEVICE — GLV 7 PROTEXIS (WHITE)

## (undated) DEVICE — GLV 7.5 PROTEXIS (BLUE)

## (undated) DEVICE — PACK HAND

## (undated) DEVICE — GLV 8 PROTEXIS (WHITE)

## (undated) DEVICE — TOURNIQUET CUFF 12" DUAL PORT W PLC

## (undated) DEVICE — POSITIONER STRAP ARMBOARD VELCRO TS-30

## (undated) DEVICE — SUT MONOSOF 5-0 18" P-12

## (undated) DEVICE — DRSG CAST PLASTER XFAST 3"

## (undated) DEVICE — SOL IRR POUR NS 0.9% 500ML

## (undated) DEVICE — DRSG WEBRIL 3"

## (undated) DEVICE — PREP CHLORAPREP HI-LITE ORANGE 26ML

## (undated) DEVICE — DRSG COBAN 2" LF STERILE

## (undated) DEVICE — VENODYNE/SCD SLEEVE CALF MEDIUM

## (undated) DEVICE — DRAPE 3/4 SHEET 52X76"

## (undated) DEVICE — TOURNIQUET CUFF 18" DUAL PORT SINGLE BLADDER W PLC  (BLACK)

## (undated) DEVICE — SOL IRR POUR H2O 1500ML

## (undated) DEVICE — DRAPE TOWEL BLUE 17" X 24"

## (undated) DEVICE — PACK UPPER EXTREMITY

## (undated) DEVICE — POSITIONER FOAM HEAD CRADLE (PINK)

## (undated) DEVICE — SPECIMEN CONTAINER PET

## (undated) DEVICE — NDL HYPO REGULAR BEVEL 25G X 1.5" (BLUE)